# Patient Record
Sex: FEMALE | Race: ASIAN | NOT HISPANIC OR LATINO | Employment: OTHER | URBAN - METROPOLITAN AREA
[De-identification: names, ages, dates, MRNs, and addresses within clinical notes are randomized per-mention and may not be internally consistent; named-entity substitution may affect disease eponyms.]

---

## 2017-12-26 ENCOUNTER — HOSPITAL ENCOUNTER (EMERGENCY)
Facility: MEDICAL CENTER | Age: 62
End: 2017-12-26

## 2017-12-26 VITALS
TEMPERATURE: 98.4 F | RESPIRATION RATE: 18 BRPM | HEART RATE: 98 BPM | SYSTOLIC BLOOD PRESSURE: 119 MMHG | OXYGEN SATURATION: 99 % | HEIGHT: 62 IN | DIASTOLIC BLOOD PRESSURE: 70 MMHG

## 2017-12-26 PROCEDURE — 302449 STATCHG TRIAGE ONLY (STATISTIC)

## 2017-12-26 NOTE — ED NOTES
Pt to triage c/o  body chills, N/V/ diarrhea, and abd pain starting last night at 2300. Pt advised to return to triage nurse for any changes or concerns.

## 2019-12-10 ENCOUNTER — HOSPITAL ENCOUNTER (OUTPATIENT)
Facility: MEDICAL CENTER | Age: 64
End: 2019-12-10
Attending: OPHTHALMOLOGY | Admitting: OPHTHALMOLOGY
Payer: MEDICARE

## 2019-12-10 ENCOUNTER — ANESTHESIA EVENT (OUTPATIENT)
Dept: SURGERY | Facility: MEDICAL CENTER | Age: 64
End: 2019-12-10
Payer: MEDICARE

## 2019-12-10 ENCOUNTER — ANESTHESIA (OUTPATIENT)
Dept: SURGERY | Facility: MEDICAL CENTER | Age: 64
End: 2019-12-10
Payer: MEDICARE

## 2019-12-10 VITALS
WEIGHT: 93.47 LBS | RESPIRATION RATE: 16 BRPM | BODY MASS INDEX: 17.2 KG/M2 | HEART RATE: 71 BPM | SYSTOLIC BLOOD PRESSURE: 142 MMHG | HEIGHT: 62 IN | OXYGEN SATURATION: 100 % | TEMPERATURE: 97.8 F | DIASTOLIC BLOOD PRESSURE: 77 MMHG

## 2019-12-10 PROCEDURE — 160009 HCHG ANES TIME/MIN: Performed by: OPHTHALMOLOGY

## 2019-12-10 PROCEDURE — 160048 HCHG OR STATISTICAL LEVEL 1-5: Performed by: OPHTHALMOLOGY

## 2019-12-10 PROCEDURE — 700105 HCHG RX REV CODE 258: Performed by: OPHTHALMOLOGY

## 2019-12-10 PROCEDURE — 502240 HCHG MISC OR SUPPLY RC 0272: Performed by: OPHTHALMOLOGY

## 2019-12-10 PROCEDURE — 700111 HCHG RX REV CODE 636 W/ 250 OVERRIDE (IP)

## 2019-12-10 PROCEDURE — 501749 HCHG SHELL REV 276: Performed by: OPHTHALMOLOGY

## 2019-12-10 PROCEDURE — 500855 HCHG NEEDLE, IRRIG CYSTOTOME 27G: Performed by: OPHTHALMOLOGY

## 2019-12-10 PROCEDURE — 700101 HCHG RX REV CODE 250

## 2019-12-10 PROCEDURE — 700111 HCHG RX REV CODE 636 W/ 250 OVERRIDE (IP): Performed by: ANESTHESIOLOGY

## 2019-12-10 PROCEDURE — 501539 HCHG TIP, PHACO: Performed by: OPHTHALMOLOGY

## 2019-12-10 PROCEDURE — 160046 HCHG PACU - 1ST 60 MINS PHASE II: Performed by: OPHTHALMOLOGY

## 2019-12-10 PROCEDURE — 160025 RECOVERY II MINUTES (STATS): Performed by: OPHTHALMOLOGY

## 2019-12-10 PROCEDURE — 160029 HCHG SURGERY MINUTES - 1ST 30 MINS LEVEL 4: Performed by: OPHTHALMOLOGY

## 2019-12-10 DEVICE — IMPLANTABLE DEVICE: Type: IMPLANTABLE DEVICE | Site: EYE | Status: FUNCTIONAL

## 2019-12-10 RX ORDER — LISINOPRIL 10 MG/1
12.5 TABLET ORAL DAILY
COMMUNITY
End: 2021-11-15

## 2019-12-10 RX ORDER — PHENYLEPHRINE HYDROCHLORIDE 25 MG/ML
SOLUTION/ DROPS OPHTHALMIC
Status: COMPLETED
Start: 2019-12-10 | End: 2019-12-10

## 2019-12-10 RX ORDER — OFLOXACIN 3 MG/ML
1 SOLUTION/ DROPS OPHTHALMIC 4 TIMES DAILY
COMMUNITY
End: 2021-11-15

## 2019-12-10 RX ORDER — HYDROCODONE BITARTRATE AND ACETAMINOPHEN 7.5; 325 MG/1; MG/1
1-2 TABLET ORAL 3 TIMES DAILY
COMMUNITY
End: 2021-11-15 | Stop reason: SDUPTHER

## 2019-12-10 RX ORDER — MELOXICAM 7.5 MG/1
15 TABLET ORAL DAILY
COMMUNITY
End: 2021-11-15

## 2019-12-10 RX ORDER — EPINEPHRINE 1 MG/ML(1)
AMPUL (ML) INJECTION
Status: DISCONTINUED
Start: 2019-12-10 | End: 2019-12-10 | Stop reason: HOSPADM

## 2019-12-10 RX ORDER — BROMFENAC 0.76 MG/ML
SOLUTION/ DROPS OPHTHALMIC
COMMUNITY
End: 2021-11-15

## 2019-12-10 RX ORDER — ONDANSETRON 2 MG/ML
4 INJECTION INTRAMUSCULAR; INTRAVENOUS
Status: DISCONTINUED | OUTPATIENT
Start: 2019-12-10 | End: 2019-12-10 | Stop reason: HOSPADM

## 2019-12-10 RX ORDER — SODIUM CHLORIDE, SODIUM LACTATE, POTASSIUM CHLORIDE, CALCIUM CHLORIDE 600; 310; 30; 20 MG/100ML; MG/100ML; MG/100ML; MG/100ML
INJECTION, SOLUTION INTRAVENOUS CONTINUOUS
Status: DISCONTINUED | OUTPATIENT
Start: 2019-12-10 | End: 2019-12-10 | Stop reason: HOSPADM

## 2019-12-10 RX ORDER — MIDAZOLAM HYDROCHLORIDE 1 MG/ML
INJECTION INTRAMUSCULAR; INTRAVENOUS PRN
Status: DISCONTINUED | OUTPATIENT
Start: 2019-12-10 | End: 2019-12-10 | Stop reason: SURG

## 2019-12-10 RX ORDER — TETRACAINE HYDROCHLORIDE 5 MG/ML
SOLUTION OPHTHALMIC
Status: COMPLETED
Start: 2019-12-10 | End: 2019-12-10

## 2019-12-10 RX ORDER — TROPICAMIDE 10 MG/ML
SOLUTION/ DROPS OPHTHALMIC
Status: COMPLETED
Start: 2019-12-10 | End: 2019-12-10

## 2019-12-10 RX ORDER — MOXIFLOXACIN 5 MG/ML
SOLUTION/ DROPS OPHTHALMIC
Status: DISCONTINUED
Start: 2019-12-10 | End: 2019-12-10 | Stop reason: HOSPADM

## 2019-12-10 RX ORDER — GABAPENTIN 300 MG/1
300 CAPSULE ORAL 2 TIMES DAILY
COMMUNITY
End: 2022-02-04 | Stop reason: SDUPTHER

## 2019-12-10 RX ADMIN — FENTANYL CITRATE 50 MCG: 50 INJECTION, SOLUTION INTRAMUSCULAR; INTRAVENOUS at 12:52

## 2019-12-10 RX ADMIN — PHENYLEPHRINE HYDROCHLORIDE 1 DROP: 2.5 SOLUTION/ DROPS OPHTHALMIC at 12:00

## 2019-12-10 RX ADMIN — SODIUM CHLORIDE, POTASSIUM CHLORIDE, SODIUM LACTATE AND CALCIUM CHLORIDE: 600; 310; 30; 20 INJECTION, SOLUTION INTRAVENOUS at 12:00

## 2019-12-10 RX ADMIN — TROPICAMIDE 1 DROP: 10 SOLUTION/ DROPS OPHTHALMIC at 12:00

## 2019-12-10 RX ADMIN — TETRACAINE HYDROCHLORIDE 1 DROP: 5 SOLUTION OPHTHALMIC at 12:00

## 2019-12-10 RX ADMIN — MIDAZOLAM 2 MG: 1 INJECTION INTRAMUSCULAR; INTRAVENOUS at 12:51

## 2019-12-10 ASSESSMENT — PAIN SCALES - GENERAL: PAIN_LEVEL: 0

## 2019-12-10 NOTE — ANESTHESIA POSTPROCEDURE EVALUATION
Patient: Jemima Delong    Procedure Summary     Date:  12/10/19 Room / Location:  [facility] ROOM 27 / SURGERY SAME DAY [facility]    Anesthesia Start:  1247 Anesthesia Stop:  1310    Procedure:  PHACOEMULSIFICATION, CATARACT, WITH IOL INSERTION (Left Eye) Diagnosis:  (COMBINED FORM CATARACTS)    Surgeon:  Jose G Benavidez M.D. Responsible Provider:  Gurmeet Nieto M.D.    Anesthesia Type:  MAC ASA Status:  2          Final Anesthesia Type: MAC  Last vitals  BP   Blood Pressure : (!) 162/76    Temp   37.1 °C (98.8 °F)    Pulse   Pulse: 67   Resp   16    SpO2   100 %      Anesthesia Post Evaluation    Patient location during evaluation: PACU  Patient participation: complete - patient participated  Level of consciousness: awake and alert  Pain score: 0    Airway patency: patent  Anesthetic complications: no  Cardiovascular status: hemodynamically stable  Respiratory status: acceptable  Hydration status: euvolemic    PONV: none           Nurse Pain Score: 0 (NPRS)

## 2019-12-10 NOTE — ANESTHESIA TIME REPORT
Anesthesia Start and Stop Event Times     Date Time Event    12/10/2019 1243 Ready for Procedure     1247 Anesthesia Start     1310 Anesthesia Stop        Responsible Staff  12/10/19    Name Role Begin End    Gurmeet Nieto M.D. Anesth 1247 1310        Preop Diagnosis (Free Text):  Pre-op Diagnosis     COMBINED FORM CATARACTS        Preop Diagnosis (Codes):    Post op Diagnosis  Combined forms of age-related cataract of left eye      Premium Reason  Non-Premium    Comments:

## 2019-12-10 NOTE — OP REPORT
DATE OF SERVICE:  12/10/2019    PREOPERATIVE DIAGNOSIS:  Cataract, left eye.    POSTOPERATIVE DIAGNOSIS:  Cataract, left eye.    PROCEDURE:  Phacoemulsification with intraocular lens implant, left eye.    SURGEON:  Jose G Benavidez MD    ANESTHESIA:  Local MAC.    PROSTHETIC DEVICES:  [employer] intraocular lens, model PCB00,   14.0 diopter, serial #[id number].    INDICATIONS:  The patient has a visually significant cataract in the left eye.    DESCRIPTION OF OPERATION:  The patient was placed in the supine position on   the operating room table.  Appropriate anesthetic monitors were positioned.    The eye was prepped and draped in the usual sterile fashion for ocular surgery   using Betadine solution.  A wire lid speculum was positioned for exposure.  A   clear cornea temporal incision was made with a brenda keratome and a   paracentesis was made with a brenda knife.  The anterior chamber was filled   with viscoelastic and a continuous curvilinear capsulorrhexis was made with   the capsulorrhexis forceps.  The lens was hydrodissected and the nucleus was   removed using the phacoemulsification handpiece and the cortex was aspirated   with the IA handpiece.  The capsular bag was filled with viscoelastic and the   intraocular lens was positioned into the capsular bag.  Residual viscoelastic   was aspirated from the anterior chamber, and the wound was hydrated with   saline solution producing a watertight closure.  The wire lid speculum was   removed and the patient was taken to the recovery room in stable condition.       ____________________________________     MD KATHY BLANCO / NTS    DD:  12/10/2019 13:48:58  DT:  12/10/2019 14:11:16    D#:  [id number]  Job#:  [id number]

## 2019-12-10 NOTE — DISCHARGE INSTRUCTIONS
HOME CARE INSTRUCTIONS FOR CATARACT SURGERY    ACTIVITY: Rest and take it easy for the first 24 hours. We strongly suggest that a responsible adult remain with you during that time. It is normal to feel sleepy. We encourage you to not do anything that requires balance, judgment or coordination. Be extra careful when walking (with a dilated eye, it is easier to trip and fall).     FOR 24 HOURS, DO NOT:       Drive, operate machinery or run household appliances.        Drink beer or alcoholic beverages.        Make important decisions or sign legal documents.     DIET: To avoid nausea, slowly advance diet as tolerated, avoiding spicy or greasy foods for the first meal.     MEDICATIONS: Resume taking daily medication. You may take Tylenol for mild discomfort, if needed.     SURGICAL DRESSING: Eye shield as instructed by your doctor. Dark glasses should be worn while in the sunlight.     Follow your Physician's instruction Sheet. Eye Kit Given    A follow-up appointment is scheduled with your doctor tomorrow at 2:15 pm.     You should call 911 if you develop problems with breathing or chest pain.  You should CALL YOUR PHYSICIAN if you develop: Sharp stabbing pain or sudden change in vision in your operative eye. If you are unable to contact your doctor or the surgical center, you should go to the nearest emergency room or urgent care center.   Physician's telephone # [phone]    If any questions arise, call your doctor. If your doctor is not available, please feel free to call Same Day Surgery at [phone]. You can also call the [facility] Hotline open 24 hours/day, 7 days/week and speak to a nurse at [phone] or [phone].     I acknowledge receipt and understanding of these Home Care Instructions.    ______________________________     _______________________________            Signature of Patient / Responsible Adult                                                       RN Signature    A registered nurse may  call you a few days after your surgery to see how you are doing.   You may also receive a survey in the mail within the next two weeks and we ask that you take a few moments to complete and return the survey. Our goal is to provide you with very good care and we value your comments. Thank you for choosing [facility].

## 2020-01-14 ENCOUNTER — HOSPITAL ENCOUNTER (OUTPATIENT)
Facility: MEDICAL CENTER | Age: 65
End: 2020-01-14
Attending: OPHTHALMOLOGY | Admitting: OPHTHALMOLOGY
Payer: MEDICARE

## 2020-01-14 ENCOUNTER — ANESTHESIA EVENT (OUTPATIENT)
Dept: SURGERY | Facility: MEDICAL CENTER | Age: 65
End: 2020-01-14
Payer: MEDICARE

## 2020-01-14 ENCOUNTER — ANESTHESIA (OUTPATIENT)
Dept: SURGERY | Facility: MEDICAL CENTER | Age: 65
End: 2020-01-14
Payer: MEDICARE

## 2020-01-14 VITALS
HEART RATE: 75 BPM | RESPIRATION RATE: 16 BRPM | DIASTOLIC BLOOD PRESSURE: 60 MMHG | BODY MASS INDEX: 17.77 KG/M2 | HEIGHT: 62 IN | OXYGEN SATURATION: 97 % | TEMPERATURE: 97.8 F | WEIGHT: 96.56 LBS | SYSTOLIC BLOOD PRESSURE: 130 MMHG

## 2020-01-14 PROCEDURE — 700101 HCHG RX REV CODE 250

## 2020-01-14 PROCEDURE — 700111 HCHG RX REV CODE 636 W/ 250 OVERRIDE (IP): Performed by: ANESTHESIOLOGY

## 2020-01-14 PROCEDURE — 700105 HCHG RX REV CODE 258: Performed by: OPHTHALMOLOGY

## 2020-01-14 PROCEDURE — 502240 HCHG MISC OR SUPPLY RC 0272: Performed by: OPHTHALMOLOGY

## 2020-01-14 PROCEDURE — 160046 HCHG PACU - 1ST 60 MINS PHASE II: Performed by: OPHTHALMOLOGY

## 2020-01-14 PROCEDURE — 160025 RECOVERY II MINUTES (STATS): Performed by: OPHTHALMOLOGY

## 2020-01-14 PROCEDURE — 160028 HCHG SURGERY MINUTES - 1ST 30 MINS LEVEL 3: Performed by: OPHTHALMOLOGY

## 2020-01-14 PROCEDURE — 501749 HCHG SHELL REV 276: Performed by: OPHTHALMOLOGY

## 2020-01-14 PROCEDURE — 501539 HCHG TIP, PHACO: Performed by: OPHTHALMOLOGY

## 2020-01-14 PROCEDURE — 700111 HCHG RX REV CODE 636 W/ 250 OVERRIDE (IP): Mod: JW

## 2020-01-14 PROCEDURE — 160048 HCHG OR STATISTICAL LEVEL 1-5: Performed by: OPHTHALMOLOGY

## 2020-01-14 PROCEDURE — 500855 HCHG NEEDLE, IRRIG CYSTOTOME 27G: Performed by: OPHTHALMOLOGY

## 2020-01-14 PROCEDURE — 160009 HCHG ANES TIME/MIN: Performed by: OPHTHALMOLOGY

## 2020-01-14 DEVICE — IMPLANTABLE DEVICE: Type: IMPLANTABLE DEVICE | Site: EYE | Status: FUNCTIONAL

## 2020-01-14 RX ORDER — TROPICAMIDE 10 MG/ML
SOLUTION/ DROPS OPHTHALMIC
Status: COMPLETED
Start: 2020-01-14 | End: 2020-01-14

## 2020-01-14 RX ORDER — SODIUM CHLORIDE, SODIUM LACTATE, POTASSIUM CHLORIDE, CALCIUM CHLORIDE 600; 310; 30; 20 MG/100ML; MG/100ML; MG/100ML; MG/100ML
INJECTION, SOLUTION INTRAVENOUS CONTINUOUS
Status: DISCONTINUED | OUTPATIENT
Start: 2020-01-14 | End: 2020-01-14 | Stop reason: HOSPADM

## 2020-01-14 RX ORDER — ONDANSETRON 2 MG/ML
4 INJECTION INTRAMUSCULAR; INTRAVENOUS
Status: DISCONTINUED | OUTPATIENT
Start: 2020-01-14 | End: 2020-01-14 | Stop reason: HOSPADM

## 2020-01-14 RX ORDER — TETRACAINE HYDROCHLORIDE 5 MG/ML
SOLUTION OPHTHALMIC
Status: COMPLETED
Start: 2020-01-14 | End: 2020-01-14

## 2020-01-14 RX ORDER — LABETALOL HYDROCHLORIDE 5 MG/ML
5 INJECTION, SOLUTION INTRAVENOUS
Status: DISCONTINUED | OUTPATIENT
Start: 2020-01-14 | End: 2020-01-14 | Stop reason: HOSPADM

## 2020-01-14 RX ORDER — PHENYLEPHRINE HYDROCHLORIDE 25 MG/ML
SOLUTION/ DROPS OPHTHALMIC
Status: COMPLETED
Start: 2020-01-14 | End: 2020-01-14

## 2020-01-14 RX ADMIN — MIDAZOLAM HYDROCHLORIDE 1 MG: 1 INJECTION, SOLUTION INTRAMUSCULAR; INTRAVENOUS at 11:28

## 2020-01-14 RX ADMIN — PHENYLEPHRINE HYDROCHLORIDE: 2.5 SOLUTION/ DROPS OPHTHALMIC at 11:00

## 2020-01-14 RX ADMIN — TROPICAMIDE 1 DROP: 10 SOLUTION/ DROPS OPHTHALMIC at 11:00

## 2020-01-14 RX ADMIN — SODIUM CHLORIDE, POTASSIUM CHLORIDE, SODIUM LACTATE AND CALCIUM CHLORIDE: 600; 310; 30; 20 INJECTION, SOLUTION INTRAVENOUS at 11:11

## 2020-01-14 RX ADMIN — TETRACAINE HYDROCHLORIDE: 5 SOLUTION OPHTHALMIC at 11:00

## 2020-01-14 RX ADMIN — FENTANYL CITRATE 50 MCG: 50 INJECTION, SOLUTION INTRAMUSCULAR; INTRAVENOUS at 11:28

## 2020-01-14 ASSESSMENT — PAIN SCALES - GENERAL: PAIN_LEVEL: 0

## 2020-01-14 NOTE — ANESTHESIA QCDR
2019 [facility] Clinical Data Registry (for Quality Improvement)     Postoperative nausea/vomiting risk protocol (Adult = 18 yrs and Pediatric 3-17 yrs)- (430 and 463)  General inhalation anesthetic (NOT TIVA) with PONV risk factors: No  Provision of anti-emetic therapy with at least 2 different classes of agents: N/A  Patient DID NOT receive anti-emetic therapy and reason is documented in Medical Record: N/A    Multimodal Pain Management- (477)  Non-emergent surgery AND patient age >= 18: Yes  Use of Multimodal Pain Management, two or more drugs and/or interventions, NOT including systemic opioids: No  Exception: Documented allergy to multiple classes of analgesics:        Smoking Abstinence (404)  Patient is current smoker (cigarette, pipe, e-cig, marijuanna): No  Elective Surgery:   Abstinence instructions provided prior to day of surgery:   Patient abstained from smoking on day of surgery:     Pre-Op Beta-Blocker in Isolated CABG (44)  Isolated CABG AND patient age >= 18: No  Beta-blocker admin within 24 hours of surgical incision:   Exception:of medical reason(s) for not administering beta blocker within 24 hours prior to surgical incision (e.g., not  indicated,other medical reason):     PACU assessment of acute postoperative pain prior to Anesthesia Care End- Applies to Patients Age = 18- (ABG7)  Initial PACU pain score is which of the following: < 7/10  Patient unable to report pain score: N/A    Post-anesthetic transfer of care checklist/protocol to PACU/ICU- (426 and 427)  Upon conclusion of case, patient transferred to which of the following locations: PACU/Non-ICU  Use of transfer checklist/protocol: Yes  Exclusion: Service Performed in Patient Hospital Room (and thus did not require transfer): N/A  Unplanned admission to ICU related to anesthesia service up through end of PACU care- (MD51)  Unplanned admission to ICU (not initially anticipated at anesthesia start time): No

## 2020-01-14 NOTE — ANESTHESIA PREPROCEDURE EVALUATION
Relevant Problems   No relevant active problems       Physical Exam    Airway   Mallampati: II  TM distance: >3 FB  Neck ROM: full       Cardiovascular - normal exam  Rhythm: regular  Rate: normal  (-) murmur     Dental - normal exam         Pulmonary - normal exam  Breath sounds clear to auscultation     Abdominal    Neurological - normal exam                 Anesthesia Plan    ASA 1       Plan - MAC             Induction: intravenous      Pertinent diagnostic labs and testing reviewed    Informed Consent:    Anesthetic plan and risks discussed with patient.    Use of blood products discussed with: patient whom consented to blood products.

## 2020-01-14 NOTE — ANESTHESIA TIME REPORT
Anesthesia Start and Stop Event Times     Date Time Event    1/14/2020 1042 Ready for Procedure     1124 Anesthesia Start        Responsible Staff  01/14/20    Name Role Begin End    Bruno Villarreal D.O. Anesth 1124         Preop Diagnosis (Free Text):  Pre-op Diagnosis     COMBINED FORM CATARACTS        Preop Diagnosis (Codes):    Post op Diagnosis  Cataract      Premium Reason  Non-Premium    Comments:

## 2020-01-14 NOTE — DISCHARGE INSTRUCTIONS
HOME CARE INSTRUCTIONS FOR CATARACT SURGERY    ACTIVITY: Rest and take it easy for the first 24 hours. We strongly suggest that a responsible adult remain with you during that time. It is normal to feel sleepy. We encourage you to not do anything that requires balance, judgment or coordination. Be extra careful when walking (with a dilated eye, it is easier to trip and fall).     FOR 24 HOURS, DO NOT:       Drive, operate machinery or run household appliances.        Drink beer or alcoholic beverages.        Make important decisions or sign legal documents.     DIET: To avoid nausea, slowly advance diet as tolerated, avoiding spicy or greasy foods for the first meal.     MEDICATIONS: Resume taking daily medication. You may take Tylenol for mild discomfort, if needed.     SURGICAL DRESSING: Eye shield as instructed by your doctor. Dark glasses should be worn while in the sunlight.     Follow your Physician's instruction Sheet. Eye Kit Given    A follow-up appointment is scheduled with your doctor tomorrow at __2:15pm.     You should call 911 if you develop problems with breathing or chest pain.  You should CALL YOUR PHYSICIAN if you develop: Sharp stabbing pain or sudden change in vision in your operative eye. If you are unable to contact your doctor or the surgical center, you should go to the nearest emergency room or urgent care center. Physician's telephone # __________________________    If any questions arise, call your doctor. If your doctor is not available, please feel free to call Same Day Surgery at [phone]. You can also call the [facility] Hotline open 24 hours/day, 7 days/week and speak to a nurse at [phone] or [phone].     I acknowledge receipt and understanding of these Home Care Instructions.    ______________________________     _______________________________            Signature of Patient / Responsible Adult                                                       RN Signature    A  registered nurse may call you a few days after your surgery to see how you are doing.   You may also receive a survey in the mail within the next two weeks and we ask that you take a few moments to complete and return the survey. Our goal is to provide you with very good care and we value your comments. Thank you for choosing [facility].

## 2020-01-14 NOTE — OR NURSING
1144-Received from OR via [facility]. S/P Right eye cataract. Eye patch in place.  Bedside report received from RN. And Anesthesiologist.    1200-sitting up drinking water,  at bedside.    1210-meets discharge criteria. Iv removed. Instructions explained to  and patient.  All questions answered. Discharged home with responsible party. Escorted to car via wheelchair.

## 2020-01-14 NOTE — ANESTHESIA POSTPROCEDURE EVALUATION
Patient: Jemima Delong    Procedure Summary     Date:  01/14/20 Room / Location:  [facility] ROOM 27 / SURGERY SAME DAY [facility]    Anesthesia Start:  1124 Anesthesia Stop:  1144    Procedure:  PHACOEMULSIFICATION, CATARACT, WITH IOL INSERTION (Right Eye) Diagnosis:  (COMBINED FORM CATARACTS)    Surgeon:  JoseG Benavidez M.D. Responsible Provider:  Bruno Villarreal D.O.    Anesthesia Type:  MAC ASA Status:  1          Final Anesthesia Type: MAC  Last vitals  BP   Blood Pressure : (!) 164/72    Temp   37.1 °C (98.7 °F)    Pulse   Pulse: 75   Resp   16    SpO2   97 %      Anesthesia Post Evaluation    Patient location during evaluation: PACU  Patient participation: complete - patient participated  Level of consciousness: awake and alert  Pain score: 0    Airway patency: patent  Anesthetic complications: no  Cardiovascular status: hemodynamically stable  Respiratory status: acceptable  Hydration status: euvolemic    PONV: none           Nurse Pain Score: 0 (NPRS)

## 2020-01-14 NOTE — OP REPORT
DATE OF SERVICE:  01/14/2020    PREOPERATIVE DIAGNOSIS:  Cataract, right eye.    POSTOPERATIVE DIAGNOSIS:  Cataract, right eye.    PROCEDURE:  Phacoemulsification with intraocular lens implant, right eye.    SURGEON:  Jose G Benavidez MD    ANESTHESIA:  Local MAC.    PROSTHETIC DEVICES:  Kareem [employer] Vision intraocular lens, model PCB00,   14.0 diopter, serial #[id number].    INDICATIONS:  The patient has a visually significant cataract in the right   eye.    DESCRIPTION OF OPERATION:  The patient was placed in the supine position on   the operating room table.  Appropriate anesthetic monitors were positioned.    The eye was prepped and draped in the usual sterile fashion for ocular surgery   using Betadine solution.  A wire lid speculum was positioned for exposure.  A   clear cornea temporal incision was made with a brenda keratome and a   paracentesis was made with a brenda knife.  The anterior chamber was filled   with viscoelastic and a continuous curvilinear capsulorrhexis was made with   the capsulorrhexis forceps.  The lens was hydrodissected and the nucleus was   removed using the phacoemulsification handpiece and the cortex was aspirated   with the IA handpiece.  The capsular bag was filled with viscoelastic and the   intraocular lens was positioned into the capsular bag.  Residual viscoelastic   was aspirated from the anterior chamber, and the wound was hydrated with   saline solution producing a watertight closure.  The wire lid speculum was   removed and the patient was taken to the recovery room in stable condition.       ____________________________________     MD KATHY BLANCO / NTS    DD:  01/14/2020 13:06:41  DT:  01/14/2020 14:03:01    D#:  [id number]  Job#:  [id number]

## 2021-03-03 DIAGNOSIS — Z23 NEED FOR VACCINATION: ICD-10-CM

## 2021-11-15 ENCOUNTER — OFFICE VISIT (OUTPATIENT)
Dept: MEDICAL GROUP | Facility: CLINIC | Age: 66
End: 2021-11-15
Payer: MEDICARE

## 2021-11-15 VITALS
BODY MASS INDEX: 19.32 KG/M2 | DIASTOLIC BLOOD PRESSURE: 70 MMHG | SYSTOLIC BLOOD PRESSURE: 110 MMHG | WEIGHT: 105 LBS | OXYGEN SATURATION: 95 % | HEART RATE: 75 BPM | HEIGHT: 62 IN | RESPIRATION RATE: 16 BRPM

## 2021-11-15 DIAGNOSIS — M54.50 CHRONIC BILATERAL LOW BACK PAIN WITHOUT SCIATICA: ICD-10-CM

## 2021-11-15 DIAGNOSIS — G89.29 CHRONIC BILATERAL LOW BACK PAIN WITHOUT SCIATICA: ICD-10-CM

## 2021-11-15 DIAGNOSIS — M67.449 DIGITAL MUCINOUS CYST: ICD-10-CM

## 2021-11-15 DIAGNOSIS — M67.449 DIGITAL MUCINOUS CYST OF FINGER: ICD-10-CM

## 2021-11-15 PROBLEM — I10 HTN (HYPERTENSION): Status: ACTIVE | Noted: 2021-11-15

## 2021-11-15 PROBLEM — M75.101 ROTATOR CUFF SYNDROME, RIGHT: Status: ACTIVE | Noted: 2019-03-04

## 2021-11-15 PROBLEM — L30.9 ECZEMA: Status: ACTIVE | Noted: 2021-08-26

## 2021-11-15 PROBLEM — M25.571 ARTHRALGIA OF RIGHT ANKLE: Status: ACTIVE | Noted: 2019-12-24

## 2021-11-15 PROBLEM — M19.90 ARTHRITIS: Status: ACTIVE | Noted: 2018-04-04

## 2021-11-15 PROBLEM — G89.4 CHRONIC PAIN SYNDROME: Status: ACTIVE | Noted: 2021-11-15

## 2021-11-15 PROCEDURE — 99214 OFFICE O/P EST MOD 30 MIN: CPT | Performed by: FAMILY MEDICINE

## 2021-11-15 RX ORDER — MELOXICAM 15 MG/1
TABLET ORAL
COMMUNITY
Start: 2021-08-30 | End: 2022-05-25 | Stop reason: SDUPTHER

## 2021-11-15 RX ORDER — TRIAMCINOLONE ACETONIDE 1 MG/G
CREAM TOPICAL
COMMUNITY
Start: 2021-08-26 | End: 2023-08-29

## 2021-11-15 RX ORDER — OXYBUTYNIN CHLORIDE 5 MG/1
TABLET ORAL
COMMUNITY
Start: 2021-08-25 | End: 2022-06-06 | Stop reason: SDUPTHER

## 2021-11-15 RX ORDER — LISINOPRIL AND HYDROCHLOROTHIAZIDE 12.5; 1 MG/1; MG/1
TABLET ORAL
COMMUNITY
Start: 2021-09-02 | End: 2022-04-14 | Stop reason: SDUPTHER

## 2021-11-15 RX ORDER — OMEPRAZOLE 20 MG/1
CAPSULE, DELAYED RELEASE ORAL
COMMUNITY
Start: 2021-11-06 | End: 2022-02-04

## 2021-11-15 RX ORDER — CYCLOBENZAPRINE HCL 10 MG
10 TABLET ORAL 3 TIMES DAILY PRN
Qty: 30 TABLET | Refills: 0 | Status: SHIPPED | OUTPATIENT
Start: 2021-11-15 | End: 2022-04-28 | Stop reason: SDUPTHER

## 2021-11-15 RX ORDER — FLUTICASONE PROPIONATE 50 MCG
SPRAY, SUSPENSION (ML) NASAL
COMMUNITY
Start: 2021-09-07 | End: 2022-03-02 | Stop reason: SDUPTHER

## 2021-11-15 RX ORDER — HYDROCODONE BITARTRATE AND ACETAMINOPHEN 7.5; 325 MG/1; MG/1
1-2 TABLET ORAL 3 TIMES DAILY
Qty: 90 TABLET | Refills: 0 | Status: SHIPPED | OUTPATIENT
Start: 2021-12-15 | End: 2022-01-12 | Stop reason: SDUPTHER

## 2021-11-15 RX ORDER — HYDROCODONE BITARTRATE AND ACETAMINOPHEN 7.5; 325 MG/1; MG/1
1-2 TABLET ORAL 3 TIMES DAILY
Qty: 90 TABLET | Refills: 0 | Status: SHIPPED | OUTPATIENT
Start: 2022-01-15 | End: 2022-02-04 | Stop reason: SDUPTHER

## 2021-11-15 RX ORDER — HYDROCODONE BITARTRATE AND ACETAMINOPHEN 7.5; 325 MG/1; MG/1
1-2 TABLET ORAL 3 TIMES DAILY
Qty: 90 TABLET | Refills: 0 | Status: SHIPPED | OUTPATIENT
Start: 2021-11-15 | End: 2021-12-15

## 2021-11-15 NOTE — PROGRESS NOTES
Subjective:     CC: Here for controlled substances refills and pain in finger    HPI:   Jemima presents today with here for controlled substances refills and pain in finger.  For the past 4 weeks she has noticed increased pain in her left first digit DIP joint.  There is a cyst located over a nodule on her DIP joint as well there is some redness around her digit as well she is currently taking meloxicam without effect.    Increasing back pain over the past 4 weeks.  She is currently taking hydrocodone.  She is also taking meloxicam and using Salonpas on her back.  No radiculopathy.    Problem   Chronic Pain Syndrome   Htn (Hypertension)   Digital Mucinous Cyst    Cyst on finger for one week.     Eczema   Arthralgia of Right Ankle   Rotator Cuff Syndrome, Right   Arthritis   Sciatic Nerve Lesion, Left   Low Back Pain    Increased pain low back.  4 days increased.  No injury.Using meloxicam and hydrocodone with topical solonpas.      Osteopenia   Plantar Fasciitis   Cataract of Both Eyes       Current Outpatient Medications Ordered in Epic   Medication Sig Dispense Refill   • fluticasone (FLONASE) 50 MCG/ACT nasal spray      • meloxicam (MOBIC) 15 MG tablet      • lisinopril-hydrochlorothiazide (PRINZIDE) 10-12.5 MG per tablet      • omeprazole (PRILOSEC) 20 MG delayed-release capsule      • oxybutynin (DITROPAN) 5 MG Tab      • triamcinolone acetonide (KENALOG) 0.1 % Cream      • diclofenac sodium (VOLTAREN) 1 % Gel Apply 2 g topically 4 times a day as needed. 100 g 3   • cyclobenzaprine (FLEXERIL) 10 mg Tab Take 1 Tablet by mouth 3 times a day as needed. 30 Tablet 0   • HYDROcodone-acetaminophen (NORCO) 7.5-325 MG per tablet Take 1-2 Tablets by mouth 3 times a day for 30 days. Indications: Pain, Chronic back pain 90 Tablet 0   • [START ON 12/15/2021] HYDROcodone-acetaminophen (NORCO) 7.5-325 MG per tablet Take 1-2 Tablets by mouth 3 times a day for 31 days. Indications: Pain, Chronic back pain 90 Tablet 0   • [START  "ON 1/15/2022] HYDROcodone-acetaminophen (NORCO) 7.5-325 MG per tablet Take 1-2 Tablets by mouth 3 times a day for 31 days. Indications: Pain, Chronic back pain 90 Tablet 0   • gabapentin (NEURONTIN) 300 MG Cap Take 300 mg by mouth 2 Times a Day. Indications: foot pain       No current Epic-ordered facility-administered medications on file.       Health Maintenance:     ROS:  Gen: no fevers/chills, no changes in weight  Eyes: no changes in vision  ENT: no sore throat, no hearing loss, no bloody nose  Pulm: no sob, no cough  CV: no chest pain, no palpitations  GI: no nausea/vomiting, no diarrhea  : no dysuria  MSk: no myalgias  Skin: no rash  Neuro: no headaches, no numbness/tingling  Heme/Lymph: no easy bruising      Objective:     Exam:  /70 (BP Location: Left arm, Patient Position: Sitting, BP Cuff Size: Adult)   Pulse 75   Resp 16   Ht 1.575 m (5' 2\")   Wt 47.6 kg (105 lb)   SpO2 95%   BMI 19.20 kg/m²  Body mass index is 19.2 kg/m².    Gen: Alert and oriented, No apparent distress.  Neck: Neck is supple without lymphadenopathy.  Lungs: Normal effort, CTA bilaterally, no wheezes, rhonchi, or rales  CV: Regular rate and rhythm. No murmurs, rubs, or gallops.  Ext: No clubbing, cyanosis, edema.          Assessment & Plan:     66 y.o. female with the following -     Problem List Items Addressed This Visit     Low back pain     Will refilll meds and send to physical therapy  Will add cyclobenzaprine at night         Relevant Medications    meloxicam (MOBIC) 15 MG tablet    cyclobenzaprine (FLEXERIL) 10 mg Tab    HYDROcodone-acetaminophen (NORCO) 7.5-325 MG per tablet    HYDROcodone-acetaminophen (NORCO) 7.5-325 MG per tablet (Start on 12/15/2021)    HYDROcodone-acetaminophen (NORCO) 7.5-325 MG per tablet (Start on 1/15/2022)    Other Relevant Orders    Referral to Physical Therapy    Controlled Substance Treatment Agreement    Digital mucinous cyst     Has osteophyte, heberden's node.  Overlying " cyst.  Continue meloxicam and will add voltaren gel           Other Visit Diagnoses     Digital mucinous cyst of finger                  Return in about 3 months (around 2/15/2022) for CS refill.    Please note that this dictation was created using voice recognition software. I have made every reasonable attempt to correct obvious errors, but I expect that there are errors of grammar and possibly content that I did not discover before finalizing the note.

## 2021-12-13 DIAGNOSIS — G89.29 CHRONIC BILATERAL LOW BACK PAIN WITHOUT SCIATICA: ICD-10-CM

## 2021-12-13 DIAGNOSIS — M54.50 CHRONIC BILATERAL LOW BACK PAIN WITHOUT SCIATICA: ICD-10-CM

## 2021-12-13 RX ORDER — HYDROCODONE BITARTRATE AND ACETAMINOPHEN 7.5; 325 MG/1; MG/1
1 TABLET ORAL EVERY 6 HOURS PRN
Qty: 30 TABLET | Refills: 0 | Status: SHIPPED | OUTPATIENT
Start: 2021-12-13 | End: 2022-01-13 | Stop reason: SDUPTHER

## 2022-01-11 ENCOUNTER — TELEPHONE (OUTPATIENT)
Dept: MEDICAL GROUP | Facility: CLINIC | Age: 67
End: 2022-01-11

## 2022-01-11 NOTE — TELEPHONE ENCOUNTER
Caller Name: Jemima Delong  Call Back Number: [phone] (home) [phone] (work)    Patients  called regarding her Hydrocodone.. she has ran out of her medication and has not been able to pick it up.. he states that they were getting a 120 quantity Rx.. they are still waiting on this approval.. they are not sure if the insurance will give them issues with the quantity ..    If they could get the 90 day quantity that would be appreciated

## 2022-01-12 DIAGNOSIS — M54.50 CHRONIC BILATERAL LOW BACK PAIN WITHOUT SCIATICA: ICD-10-CM

## 2022-01-12 DIAGNOSIS — G89.29 CHRONIC BILATERAL LOW BACK PAIN WITHOUT SCIATICA: ICD-10-CM

## 2022-01-12 RX ORDER — HYDROCODONE BITARTRATE AND ACETAMINOPHEN 7.5; 325 MG/1; MG/1
1-2 TABLET ORAL 3 TIMES DAILY
Qty: 90 TABLET | Refills: 0 | Status: CANCELLED | OUTPATIENT
Start: 2022-01-15 | End: 2022-02-15

## 2022-01-12 RX ORDER — HYDROCODONE BITARTRATE AND ACETAMINOPHEN 7.5; 325 MG/1; MG/1
1-2 TABLET ORAL EVERY 4 HOURS PRN
Qty: 120 TABLET | Refills: 0 | Status: SHIPPED | OUTPATIENT
Start: 2022-01-12 | End: 2022-02-04 | Stop reason: RX

## 2022-01-13 DIAGNOSIS — M54.50 CHRONIC BILATERAL LOW BACK PAIN WITHOUT SCIATICA: ICD-10-CM

## 2022-01-13 DIAGNOSIS — G89.29 CHRONIC BILATERAL LOW BACK PAIN WITHOUT SCIATICA: ICD-10-CM

## 2022-01-13 RX ORDER — HYDROCODONE BITARTRATE AND ACETAMINOPHEN 7.5; 325 MG/1; MG/1
1 TABLET ORAL EVERY 6 HOURS PRN
Qty: 30 TABLET | Refills: 0 | Status: SHIPPED | OUTPATIENT
Start: 2022-01-13 | End: 2022-02-04 | Stop reason: RX

## 2022-01-13 NOTE — TELEPHONE ENCOUNTER
I spoke with the pharmacy regarding the issue.   She states that the insurance will not cover the medication with a sig of 31 days, they will only cover with a sig of 30 days. Could you please update the sig to 30 days?    The patient is also requesting a 90 day supply for future fills.     Thank you

## 2022-01-13 NOTE — TELEPHONE ENCOUNTER
Received request via: Patient    Was the patient seen in the last year in this department? Yes    Does the patient have an active prescription (recently filled or refills available) for medication(s) requested? No    Patient is requesting a 90 day supply

## 2022-02-04 ENCOUNTER — OFFICE VISIT (OUTPATIENT)
Dept: MEDICAL GROUP | Facility: CLINIC | Age: 67
End: 2022-02-04
Payer: MEDICARE

## 2022-02-04 VITALS
RESPIRATION RATE: 16 BRPM | HEART RATE: 79 BPM | BODY MASS INDEX: 19.32 KG/M2 | SYSTOLIC BLOOD PRESSURE: 120 MMHG | OXYGEN SATURATION: 94 % | WEIGHT: 105 LBS | HEIGHT: 62 IN | DIASTOLIC BLOOD PRESSURE: 68 MMHG

## 2022-02-04 DIAGNOSIS — M54.50 CHRONIC BILATERAL LOW BACK PAIN WITHOUT SCIATICA: ICD-10-CM

## 2022-02-04 DIAGNOSIS — S86.892A LEFT MEDIAL TIBIAL STRESS SYNDROME, INITIAL ENCOUNTER: ICD-10-CM

## 2022-02-04 DIAGNOSIS — G89.29 CHRONIC BILATERAL LOW BACK PAIN WITHOUT SCIATICA: ICD-10-CM

## 2022-02-04 DIAGNOSIS — M19.90 ARTHRITIS: ICD-10-CM

## 2022-02-04 PROCEDURE — 99214 OFFICE O/P EST MOD 30 MIN: CPT | Performed by: FAMILY MEDICINE

## 2022-02-04 RX ORDER — HYDROCODONE BITARTRATE AND ACETAMINOPHEN 7.5; 325 MG/1; MG/1
1-2 TABLET ORAL EVERY 4 HOURS PRN
Qty: 120 TABLET | Refills: 0 | Status: SHIPPED | OUTPATIENT
Start: 2022-02-04 | End: 2022-03-04

## 2022-02-04 RX ORDER — HYDROCODONE BITARTRATE AND ACETAMINOPHEN 7.5; 325 MG/1; MG/1
1-2 TABLET ORAL EVERY 4 HOURS PRN
Qty: 120 TABLET | Refills: 0 | Status: SHIPPED | OUTPATIENT
Start: 2022-04-01 | End: 2022-04-05 | Stop reason: SDUPTHER

## 2022-02-04 RX ORDER — HYDROCODONE BITARTRATE AND ACETAMINOPHEN 7.5; 325 MG/1; MG/1
1-2 TABLET ORAL EVERY 4 HOURS PRN
Qty: 120 TABLET | Refills: 0 | Status: SHIPPED | OUTPATIENT
Start: 2022-03-04 | End: 2022-04-01

## 2022-02-04 RX ORDER — HYDROCODONE BITARTRATE AND ACETAMINOPHEN 7.5; 325 MG/1; MG/1
1 TABLET ORAL EVERY 4 HOURS PRN
COMMUNITY
End: 2022-02-04 | Stop reason: RX

## 2022-02-04 RX ORDER — GABAPENTIN 300 MG/1
300 CAPSULE ORAL 2 TIMES DAILY
Qty: 90 CAPSULE | Refills: 3 | Status: SHIPPED | OUTPATIENT
Start: 2022-02-04 | End: 2022-06-06 | Stop reason: SDUPTHER

## 2022-02-04 NOTE — PROGRESS NOTES
Subjective:     CC: Medication issues    HPI:   Jemima presents today with complaints of the pharmacy getting her medications and correct.  Do not know exactly what is happening, the pharmacy keeps on refilling her medications incorrectly.  In addition when they do fill minutes and an accurate amount is not what I been prescribing.  Patient is very frustrated as is her physician.    She has a new complaint of left shin splint.  She knows of no activity that brought that on.  She is currently doing some massage and taking NSAIDs.  New    She is developing quite a bit of Heberden's nodules on her DIP joints.  She has a new cyst located on her right first DIP joint.  It is pretty tender.    Her back pain is about the same.  No known injury slightly worse the last 4 days.  She is using a combination of meloxicam Salonpas and hydrocodone.  She has finished physical therapy.    Problem   Left Medial Tibial Stress Syndrome    L shin splint     Arthritis    Has osteophytes on fingers and noting cyst R 1st DIP joint.     Low Back Pain    Increased pain low back.  4 days increased.  No injury.Using meloxicam and hydrocodone with topical solonpas. Has had issues with getting pain meds refilled         Current Outpatient Medications Ordered in Epic   Medication Sig Dispense Refill   • HYDROcodone-acetaminophen (NORCO) 7.5-325 MG per tablet Take 1-2 Tablets by mouth every four hours as needed for up to 28 days. Indications: Pain, Chronic back pain 120 Tablet 0   • [START ON 3/4/2022] HYDROcodone-acetaminophen (NORCO) 7.5-325 MG per tablet Take 1-2 Tablets by mouth every four hours as needed for up to 28 days. Indications: Pain, Chronic back pain 120 Tablet 0   • [START ON 4/1/2022] HYDROcodone-acetaminophen (NORCO) 7.5-325 MG per tablet Take 1-2 Tablets by mouth every four hours as needed for up to 28 days. Indications: Pain, Chronic back pain 120 Tablet 0   • gabapentin (NEURONTIN) 300 MG Cap Take 1 Capsule by mouth 2 times a  "day. Indications: foot pain 90 Capsule 3   • fluticasone (FLONASE) 50 MCG/ACT nasal spray      • meloxicam (MOBIC) 15 MG tablet      • lisinopril-hydrochlorothiazide (PRINZIDE) 10-12.5 MG per tablet      • oxybutynin (DITROPAN) 5 MG Tab      • triamcinolone acetonide (KENALOG) 0.1 % Cream      • diclofenac sodium (VOLTAREN) 1 % Gel Apply 2 g topically 4 times a day as needed. 100 g 3   • cyclobenzaprine (FLEXERIL) 10 mg Tab Take 1 Tablet by mouth 3 times a day as needed. 30 Tablet 0     No current Epic-ordered facility-administered medications on file.       Health Maintenance:     ROS:  Gen: no fevers/chills, no changes in weight  Eyes: no changes in vision  ENT: no sore throat, no hearing loss, no bloody nose  Pulm: no sob, no cough  CV: no chest pain, no palpitations  GI: no nausea/vomiting, no diarrhea  : no dysuria  MSk: no myalgias  Skin: no rash  Neuro: no headaches, no numbness/tingling  Heme/Lymph: no easy bruising      Objective:     Exam:  /68 (BP Location: Right arm, Patient Position: Sitting, BP Cuff Size: Adult)   Pulse 79   Resp 16   Ht 1.575 m (5' 2\")   Wt 47.6 kg (105 lb)   SpO2 94%   BMI 19.20 kg/m²  Body mass index is 19.2 kg/m².    Gen: Alert and oriented, No apparent distress.  Neck: Neck is supple without lymphadenopathy.  Lungs: Normal effort, CTA bilaterally, no wheezes, rhonchi, or rales  CV: Regular rate and rhythm. No murmurs, rubs, or gallops.  Ext: No clubbing, cyanosis, edema.  New nodule located over the right first DIP joint tender to palpation no inflammation.  Tender left anterior tibia area.  Consistent with shinsplints.    Labs: None    Assessment & Plan:     66 y.o. female with the following -     Problem List Items Addressed This Visit     Arthritis     Continue NSAIDS for now.  Only other thing is to refer to surgery.  Ortho has already said not to do surgery at this time         Relevant Medications    HYDROcodone-acetaminophen (NORCO) 7.5-325 MG per tablet    " HYDROcodone-acetaminophen (NORCO) 7.5-325 MG per tablet (Start on 3/4/2022)    HYDROcodone-acetaminophen (NORCO) 7.5-325 MG per tablet (Start on 4/1/2022)    Low back pain     Refill pain meds         Relevant Medications    HYDROcodone-acetaminophen (NORCO) 7.5-325 MG per tablet    HYDROcodone-acetaminophen (NORCO) 7.5-325 MG per tablet (Start on 3/4/2022)    HYDROcodone-acetaminophen (NORCO) 7.5-325 MG per tablet (Start on 4/1/2022)    Left medial tibial stress syndrome     Rest.  Massage and NSAIDs as needed                   Return in about 3 months (around 5/4/2022) for med refills.    Please note that this dictation was created using voice recognition software. I have made every reasonable attempt to correct obvious errors, but I expect that there are errors of grammar and possibly content that I did not discover before finalizing the note.

## 2022-02-04 NOTE — ASSESSMENT & PLAN NOTE
Continue NSAIDS for now.  Only other thing is to refer to surgery.  Ortho has already said not to do surgery at this time

## 2022-03-03 RX ORDER — FLUTICASONE PROPIONATE 50 MCG
2 SPRAY, SUSPENSION (ML) NASAL DAILY
Qty: 16 G | Refills: 3 | Status: SHIPPED | OUTPATIENT
Start: 2022-03-03 | End: 2023-05-17 | Stop reason: SDUPTHER

## 2022-03-30 ENCOUNTER — TELEPHONE (OUTPATIENT)
Dept: MEDICAL GROUP | Facility: CLINIC | Age: 67
End: 2022-03-30
Payer: MEDICARE

## 2022-03-30 NOTE — TELEPHONE ENCOUNTER
VOICEMAIL  1. Caller Name: Caden                      Call Back Number: [phone]    2. Message: Pts spouse called regarding a sore on her back for 1 mo. Possibly needing an appointment. Called Caden back and L/M.    3. Patient approves office to leave a detailed voicemail/MyChart message: no

## 2022-04-05 ENCOUNTER — OFFICE VISIT (OUTPATIENT)
Dept: MEDICAL GROUP | Facility: CLINIC | Age: 67
End: 2022-04-05
Payer: MEDICARE

## 2022-04-05 VITALS
BODY MASS INDEX: 18.38 KG/M2 | WEIGHT: 99.9 LBS | HEIGHT: 62 IN | HEART RATE: 80 BPM | SYSTOLIC BLOOD PRESSURE: 124 MMHG | DIASTOLIC BLOOD PRESSURE: 79 MMHG | OXYGEN SATURATION: 95 %

## 2022-04-05 DIAGNOSIS — G89.29 CHRONIC BILATERAL LOW BACK PAIN WITHOUT SCIATICA: ICD-10-CM

## 2022-04-05 DIAGNOSIS — M54.50 CHRONIC BILATERAL LOW BACK PAIN WITHOUT SCIATICA: ICD-10-CM

## 2022-04-05 DIAGNOSIS — L98.9 BENIGN SKIN LESION OF LOWER BACK: ICD-10-CM

## 2022-04-05 PROCEDURE — 99213 OFFICE O/P EST LOW 20 MIN: CPT | Mod: GE | Performed by: STUDENT IN AN ORGANIZED HEALTH CARE EDUCATION/TRAINING PROGRAM

## 2022-04-05 RX ORDER — NALOXONE HYDROCHLORIDE 4 MG/.1ML
SPRAY NASAL
Qty: 1 EACH | Refills: 1 | Status: SHIPPED | OUTPATIENT
Start: 2022-04-05

## 2022-04-05 RX ORDER — HYDROCODONE BITARTRATE AND ACETAMINOPHEN 7.5; 325 MG/1; MG/1
TABLET ORAL
COMMUNITY
Start: 2021-03-05 | End: 2022-04-05

## 2022-04-05 RX ORDER — HYDROCODONE BITARTRATE AND ACETAMINOPHEN 7.5; 325 MG/1; MG/1
1-2 TABLET ORAL EVERY 4 HOURS PRN
Qty: 120 TABLET | Refills: 0 | Status: SHIPPED | OUTPATIENT
Start: 2022-05-03 | End: 2022-04-28

## 2022-04-05 RX ORDER — TRIAMCINOLONE ACETONIDE 1 MG/G
CREAM TOPICAL
COMMUNITY
Start: 2021-08-26 | End: 2022-04-05

## 2022-04-05 RX ORDER — LISINOPRIL AND HYDROCHLOROTHIAZIDE 12.5; 1 MG/1; MG/1
TABLET ORAL
COMMUNITY
Start: 2014-03-22 | End: 2022-04-05

## 2022-04-05 RX ORDER — HYDROCODONE BITARTRATE AND ACETAMINOPHEN 7.5; 325 MG/1; MG/1
1-2 TABLET ORAL EVERY 4 HOURS PRN
Qty: 120 TABLET | Refills: 0 | Status: SHIPPED | OUTPATIENT
Start: 2022-06-01 | End: 2022-04-28 | Stop reason: SDUPTHER

## 2022-04-06 NOTE — ASSESSMENT & PLAN NOTE
Physical exam shows 2 small areas of well-healing skin with slight erythema but no skin breaks and no signs of infection.  Given lack of pain and very well appearing skin, I recommended that the patient use topical vitamin E oil over-the-counter to promote scar healing.

## 2022-04-06 NOTE — PROGRESS NOTES
Subjective:     CC: Norco refills, spot on back    HPI:   Jemima presents today with the above chief complaints    Problem   Benign Skin Lesion of Lower Back    Patient states that a few years ago, she had shingles on her right lumbar/upper gluteal area.  This resolved until about 4 to 5 weeks ago when she began having pain in the area.  She was seen by Dr. Foreman for the same, and prescribed Voltaren gel.  Since that time, she has had no pain.  She wonders whether she needs antibiotics today.     Low Back Pain    Patient with longstanding history of low back pain.  Is normally seen by Dr. Foreman for this, and has been taking Norco 7.5-325 mg tablets for multiple years.  She states she has never had any adverse reaction secondary to this medication, including confusion, falls, respiratory depression.    She and her  state that they have been having a lot of problems with her old pharmacy for several months, so recently changed all of her medications over to a new pharmacy.  They were informed by her old pharmacy that because they have done this, she will no longer be able to refill her [place] prescription at that pharmacy.  (She was able to fill her first 30-day supplies from Dr. Foreman there already but will require 2 more 30-day supply as of May 3 and June 1.)         Current Outpatient Medications Ordered in Epic   Medication Sig Dispense Refill   • [START ON 5/3/2022] HYDROcodone-acetaminophen (NORCO) 7.5-325 MG tab Take 1-2 Tablets by mouth every four hours as needed for up to 28 days. Indications: Pain, Chronic back pain 120 Tablet 0   • [START ON 6/1/2022] HYDROcodone-acetaminophen (NORCO) 7.5-325 MG tab Take 1-2 Tablets by mouth every four hours as needed for up to 28 days. Indications: Pain, Chronic back pain 120 Tablet 0   • Naloxone (NARCAN) 4 MG/0.1ML Liquid One spray in one nostril for overdose and call 911. 1 Each 1   • fluticasone (FLONASE) 50 MCG/ACT nasal spray Administer 2 Sprays into affected  "nostril(S) every day. 16 g 3   • gabapentin (NEURONTIN) 300 MG Cap Take 1 Capsule by mouth 2 times a day. Indications: foot pain 90 Capsule 3   • meloxicam (MOBIC) 15 MG tablet      • lisinopril-hydrochlorothiazide (PRINZIDE) 10-12.5 MG per tablet      • oxybutynin (DITROPAN) 5 MG Tab      • triamcinolone acetonide (KENALOG) 0.1 % Cream      • diclofenac sodium (VOLTAREN) 1 % Gel Apply 2 g topically 4 times a day as needed. 100 g 3   • cyclobenzaprine (FLEXERIL) 10 mg Tab Take 1 Tablet by mouth 3 times a day as needed. 30 Tablet 0     No current Epic-ordered facility-administered medications on file.       Health Maintenance: Deferred    ROS:  Gen: no fevers/chills, no changes in weight  Eyes: no changes in vision  ENT: no sore throat, no hearing loss, no rhinorrhea  Pulm: no sob, no cough  CV: no chest pain, no palpitations  GI: no nausea/vomiting, no diarrhea  : no dysuria  MSk: Chronic low back pain without radiation to the extremities  Skin: See HPI  Neuro: no headaches, no numbness/tingling, no weakness  Heme/Lymph: no easy bruising      Objective:     Exam:  /79   Pulse 80   Ht 1.575 m (5' 2\")   Wt 45.3 kg (99 lb 14.4 oz)   SpO2 95%   BMI 18.27 kg/m²  Body mass index is 18.27 kg/m².    Gen: Alert and oriented, No apparent distress.  Neck: Neck is supple without lymphadenopathy.  Lungs: Normal effort, CTA bilaterally, no wheezes, rhonchi, or rales  CV: Regular rate and rhythm. No murmurs, rubs, or gallops.  Ext: No clubbing, cyanosis, edema.  Integument: There is a well-healing 2 x 4 mm and 2 x 3 mm patch of healthy appearing, slightly erythematous skin to the upper aspect of the right gluteal area.  The skin is completely intact there, and there is no edema or underlying fluctuance.        Assessment & Plan:     67 y.o. female with the following -     Problem List Items Addressed This Visit     Low back pain     Patient educated on the potential side effects of this medication.  Her daily MME is " 45-90 as the prescription is written.  Review of PDMP shows no signs of diversion or misuse.  The patient is a well established patient of another physician in this group.    The patient states she does not have a Narcan prescription at home, so I have ordered this for her in case she has any adverse reactions to this medication.  She will follow with Dr. Foreman for her regularly scheduled visit next month.         Relevant Medications    HYDROcodone-acetaminophen (NORCO) 7.5-325 MG tab (Start on 5/3/2022)    HYDROcodone-acetaminophen (NORCO) 7.5-325 MG tab (Start on 6/1/2022)    Benign skin lesion of lower back     Physical exam shows 2 small areas of well-healing skin with slight erythema but no skin breaks and no signs of infection.  Given lack of pain and very well appearing skin, I recommended that the patient use topical vitamin E oil over-the-counter to promote scar healing.                   No follow-ups on file.  Follow-up with Dr. Foreman as regularly scheduled.

## 2022-04-06 NOTE — ASSESSMENT & PLAN NOTE
Patient educated on the potential side effects of this medication.  Her daily MME is 45-90 as the prescription is written.  Review of PDMP shows no signs of diversion or misuse.  The patient is a well established patient of another physician in this group.    The patient states she does not have a Narcan prescription at home, so I have ordered this for her in case she has any adverse reactions to this medication.  She will follow with Dr. Foreman for her regularly scheduled visit next month.

## 2022-04-15 RX ORDER — LISINOPRIL AND HYDROCHLOROTHIAZIDE 12.5; 1 MG/1; MG/1
1 TABLET ORAL DAILY
Qty: 90 TABLET | Refills: 3 | Status: SHIPPED | OUTPATIENT
Start: 2022-04-15 | End: 2022-12-13

## 2022-04-28 ENCOUNTER — OFFICE VISIT (OUTPATIENT)
Dept: MEDICAL GROUP | Facility: CLINIC | Age: 67
End: 2022-04-28
Payer: MEDICARE

## 2022-04-28 VITALS
SYSTOLIC BLOOD PRESSURE: 113 MMHG | OXYGEN SATURATION: 94 % | BODY MASS INDEX: 18.22 KG/M2 | DIASTOLIC BLOOD PRESSURE: 73 MMHG | HEIGHT: 62 IN | HEART RATE: 83 BPM | WEIGHT: 99 LBS

## 2022-04-28 DIAGNOSIS — I10 PRIMARY HYPERTENSION: ICD-10-CM

## 2022-04-28 DIAGNOSIS — G89.29 CHRONIC BILATERAL LOW BACK PAIN WITHOUT SCIATICA: ICD-10-CM

## 2022-04-28 DIAGNOSIS — M54.50 CHRONIC BILATERAL LOW BACK PAIN WITHOUT SCIATICA: ICD-10-CM

## 2022-04-28 DIAGNOSIS — L98.9 BENIGN SKIN LESION OF LOWER BACK: ICD-10-CM

## 2022-04-28 PROCEDURE — 99214 OFFICE O/P EST MOD 30 MIN: CPT | Performed by: FAMILY MEDICINE

## 2022-04-28 RX ORDER — HYDROCODONE BITARTRATE AND ACETAMINOPHEN 7.5; 325 MG/1; MG/1
1-2 TABLET ORAL EVERY 4 HOURS PRN
Qty: 120 TABLET | Refills: 0 | Status: SHIPPED | OUTPATIENT
Start: 2022-07-27 | End: 2022-08-24

## 2022-04-28 RX ORDER — CYCLOBENZAPRINE HCL 10 MG
10 TABLET ORAL 3 TIMES DAILY PRN
Qty: 30 TABLET | Refills: 0 | Status: SHIPPED | OUTPATIENT
Start: 2022-04-28 | End: 2022-06-06 | Stop reason: SDUPTHER

## 2022-04-28 RX ORDER — HYDROCODONE BITARTRATE AND ACETAMINOPHEN 7.5; 325 MG/1; MG/1
1-2 TABLET ORAL EVERY 4 HOURS PRN
Qty: 120 TABLET | Refills: 0 | Status: SHIPPED | OUTPATIENT
Start: 2022-06-29 | End: 2022-06-06 | Stop reason: SDUPTHER

## 2022-04-28 RX ORDER — HYDROCODONE BITARTRATE AND ACETAMINOPHEN 7.5; 325 MG/1; MG/1
1-2 TABLET ORAL EVERY 4 HOURS PRN
Qty: 120 TABLET | Refills: 0 | Status: SHIPPED | OUTPATIENT
Start: 2022-06-01 | End: 2022-06-29

## 2022-04-28 NOTE — PROGRESS NOTES
Subjective:     CC: Follow-up for shingles and controlled substances refill    HPI:   Jemima presents today with follow-up for shingles.  She had shingles approximately 2 months ago and some postherpetic neuralgia that followed.  She has been treating her shingles with hydrocortisone cream and it seems to resolve now.  She does have some scarring left over.    She also needs a refill of her controlled substances.  She has been on hydrocodone every 4 hours as need be for pain for multiple years.  This is both pain related to her low back and to her feet she has chronic plantar fasciitis as well.  No red flags.    This is also recheck for hypertension.  Her blood pressure is really good today.  No history of chest pain or palpitations.    Problem   Benign Skin Lesion of Lower Back    Patient states that a few years ago, she had shingles on her right lumbar/upper gluteal area.  This resolved until about 4 to 5 weeks ago when she began having pain in the area.  She was seen by Dr. Foreman for the same, and prescribed Voltaren gel.  Since that time, she has had no pain.  She wonders whether she needs antibiotics today.     Htn (Hypertension)    BP doing well     Low Back Pain    Patient with longstanding history of low back pain.  Is normally seen by Dr. Foreman for this, and has been taking Norco 7.5-325 mg tablets for multiple years.  She states she has never had any adverse reaction secondary to this medication, including confusion, falls, respiratory depression.    She and her  state that they have been having a lot of problems with her old pharmacy for several months, so recently changed all of her medications over to a new pharmacy.  They were informed by her old pharmacy that because they have done this, she will no longer be able to refill her [place] prescription at that pharmacy.  (She was able to fill her first 30-day supplies from Dr. Foreman there already but will require 2 more 30-day supply as of May 3  and June 1.)         Current Outpatient Medications Ordered in Epic   Medication Sig Dispense Refill   • [START ON 6/1/2022] HYDROcodone-acetaminophen (NORCO) 7.5-325 MG tab Take 1-2 Tablets by mouth every four hours as needed for Severe Pain for up to 28 days. Indications: Pain, Chronic back pain 120 Tablet 0   • [START ON 6/29/2022] HYDROcodone-acetaminophen (NORCO) 7.5-325 MG tab Take 1-2 Tablets by mouth every four hours as needed for Severe Pain for up to 28 days. Indications: Pain, Chronic back pain 120 Tablet 0   • [START ON 7/27/2022] HYDROcodone-acetaminophen (NORCO) 7.5-325 MG tab Take 1-2 Tablets by mouth every four hours as needed for Severe Pain for up to 28 days. Indications: Pain, Chronic back pain 120 Tablet 0   • cyclobenzaprine (FLEXERIL) 10 mg Tab Take 1 Tablet by mouth 3 times a day as needed for Muscle Spasms. 30 Tablet 0   • lisinopril-hydrochlorothiazide (PRINZIDE) 10-12.5 MG per tablet Take 1 Tablet by mouth every day. 90 Tablet 3   • Naloxone (NARCAN) 4 MG/0.1ML Liquid One spray in one nostril for overdose and call 911. 1 Each 1   • fluticasone (FLONASE) 50 MCG/ACT nasal spray Administer 2 Sprays into affected nostril(S) every day. 16 g 3   • gabapentin (NEURONTIN) 300 MG Cap Take 1 Capsule by mouth 2 times a day. Indications: foot pain 90 Capsule 3   • meloxicam (MOBIC) 15 MG tablet      • oxybutynin (DITROPAN) 5 MG Tab      • triamcinolone acetonide (KENALOG) 0.1 % Cream      • diclofenac sodium (VOLTAREN) 1 % Gel Apply 2 g topically 4 times a day as needed. 100 g 3     No current [facility]-ordered facility-administered medications on file.       Health Maintenance:     ROS:  Gen: no fevers/chills, no changes in weight  Eyes: no changes in vision  ENT: no sore throat, no hearing loss, no bloody nose  Pulm: no sob, no cough  CV: no chest pain, no palpitations  GI: no nausea/vomiting, no diarrhea  : no dysuria  MSk: no myalgias  Skin: no rash  Neuro: no headaches, no  "numbness/tingling  Heme/Lymph: no easy bruising      Objective:     Exam:  /73   Pulse 83   Ht 1.575 m (5' 2\")   Wt 44.9 kg (99 lb)   SpO2 94%   BMI 18.11 kg/m²  Body mass index is 18.11 kg/m².    Gen: Alert and oriented, No apparent distress.  Neck: Neck is supple without lymphadenopathy.  Lungs: Normal effort, CTA bilaterally, no wheezes, rhonchi, or rales  CV: Regular rate and rhythm. No murmurs, rubs, or gallops.  Ext: No clubbing, cyanosis, edema.      Labs: none    Assessment & Plan:     67 y.o. female with the following -     Problem List Items Addressed This Visit     HTN (hypertension)     No change in meds         Low back pain     Refilled gabapentin and hydrocodone  Occasional cyclobenzaprine is helpful         Relevant Medications    HYDROcodone-acetaminophen (NORCO) 7.5-325 MG tab (Start on 6/1/2022)    HYDROcodone-acetaminophen (NORCO) 7.5-325 MG tab (Start on 6/29/2022)    HYDROcodone-acetaminophen (NORCO) 7.5-325 MG tab (Start on 7/27/2022)    cyclobenzaprine (FLEXERIL) 10 mg Tab    Benign skin lesion of lower back     Shingles resolved.  Some scarring                   No follow-ups on file.    Please note that this dictation was created using voice recognition software. I have made every reasonable attempt to correct obvious errors, but I expect that there are errors of grammar and possibly content that I did not discover before finalizing the note.      "

## 2022-05-02 RX ORDER — OMEPRAZOLE 20 MG/1
CAPSULE, DELAYED RELEASE ORAL
Qty: 90 CAPSULE | Refills: 3 | Status: SHIPPED | OUTPATIENT
Start: 2022-05-02

## 2022-05-25 RX ORDER — MELOXICAM 15 MG/1
15 TABLET ORAL DAILY
Qty: 90 TABLET | Refills: 3 | Status: SHIPPED | OUTPATIENT
Start: 2022-05-25 | End: 2022-06-06 | Stop reason: SDUPTHER

## 2022-06-06 ENCOUNTER — OFFICE VISIT (OUTPATIENT)
Dept: MEDICAL GROUP | Facility: CLINIC | Age: 67
End: 2022-06-06
Payer: MEDICARE

## 2022-06-06 VITALS
WEIGHT: 101 LBS | HEART RATE: 81 BPM | OXYGEN SATURATION: 92 % | RESPIRATION RATE: 16 BRPM | BODY MASS INDEX: 18.58 KG/M2 | HEIGHT: 62 IN | SYSTOLIC BLOOD PRESSURE: 117 MMHG | DIASTOLIC BLOOD PRESSURE: 44 MMHG

## 2022-06-06 DIAGNOSIS — G89.29 CHRONIC BILATERAL LOW BACK PAIN WITHOUT SCIATICA: ICD-10-CM

## 2022-06-06 DIAGNOSIS — M75.101 ROTATOR CUFF SYNDROME, RIGHT: ICD-10-CM

## 2022-06-06 DIAGNOSIS — H01.119 CONTACT DERMATITIS OF EYELID, UNSPECIFIED LATERALITY: ICD-10-CM

## 2022-06-06 DIAGNOSIS — M54.50 CHRONIC BILATERAL LOW BACK PAIN WITHOUT SCIATICA: ICD-10-CM

## 2022-06-06 DIAGNOSIS — I10 PRIMARY HYPERTENSION: ICD-10-CM

## 2022-06-06 PROCEDURE — 99214 OFFICE O/P EST MOD 30 MIN: CPT | Mod: 25 | Performed by: FAMILY MEDICINE

## 2022-06-06 PROCEDURE — 20610 DRAIN/INJ JOINT/BURSA W/O US: CPT | Mod: RT | Performed by: FAMILY MEDICINE

## 2022-06-06 RX ORDER — CYCLOBENZAPRINE HCL 10 MG
10 TABLET ORAL 3 TIMES DAILY PRN
Qty: 30 TABLET | Refills: 0 | Status: SHIPPED | OUTPATIENT
Start: 2022-06-06 | End: 2022-06-13

## 2022-06-06 RX ORDER — KETOROLAC TROMETHAMINE 5 MG/ML
1 SOLUTION OPHTHALMIC 4 TIMES DAILY
Qty: 5 ML | Refills: 3 | Status: SHIPPED | OUTPATIENT
Start: 2022-06-06 | End: 2022-10-31 | Stop reason: SDUPTHER

## 2022-06-06 RX ORDER — MELOXICAM 15 MG/1
15 TABLET ORAL DAILY
Qty: 90 TABLET | Refills: 3 | Status: SHIPPED | OUTPATIENT
Start: 2022-06-06 | End: 2022-10-31 | Stop reason: SDUPTHER

## 2022-06-06 RX ORDER — HYDROCODONE BITARTRATE AND ACETAMINOPHEN 7.5; 325 MG/1; MG/1
1-2 TABLET ORAL EVERY 4 HOURS PRN
Qty: 120 TABLET | Refills: 0 | Status: SHIPPED | OUTPATIENT
Start: 2022-07-31 | End: 2022-08-28

## 2022-06-06 RX ORDER — HYDROCODONE BITARTRATE AND ACETAMINOPHEN 7.5; 325 MG/1; MG/1
1-2 TABLET ORAL EVERY 4 HOURS PRN
Qty: 120 TABLET | Refills: 0 | Status: SHIPPED | OUTPATIENT
Start: 2022-07-03 | End: 2022-07-31

## 2022-06-06 RX ORDER — HYDROCODONE BITARTRATE AND ACETAMINOPHEN 7.5; 325 MG/1; MG/1
1-2 TABLET ORAL EVERY 4 HOURS PRN
Qty: 120 TABLET | Refills: 0 | Status: SHIPPED | OUTPATIENT
Start: 2022-08-28 | End: 2022-09-06 | Stop reason: SDUPTHER

## 2022-06-06 NOTE — PROGRESS NOTES
Subjective:     CC: Medication refill and shoulder pain    HPI:   Jemima presents today with medication refill and shoulder pain.  She has had a fair amount of trouble with her hydrocodone refills but is about ready for another refill.  Her back pain has been manageable with the hydrocodone.  She is also seeing pain management who just did x-rays after doing a couple of epidurals without relief.    Jemima also has issues with right shoulder pain she has had previous injections with cortisone and had really good relief.  She is asking for another cortisone injection.  She is taking meloxicam 15 mg daily without relief.  She does complain about pain more in the supraspinatus muscle area rather than the supraspinatus tendon area.  Realizing that she request to go ahead with the injection.    Jemima has also had issues with allergic blepharitis.  She responds well to Acular.  She is requesting a refill of her Acular.    Problem   Allergic Blepharitis    Bilateral blepharitis.  Responded to acular     Htn (Hypertension)    BP doing well     Rotator Cuff Syndrome, Right    Failed NSAIDs and would like injection     Low Back Pain    Patient with longstanding history of low back pain.  Is normally seen by Dr. Foreman for this, and has been taking Norco 7.5-325 mg tablets for multiple years.  She states she has never had any adverse reaction secondary to this medication, including confusion, falls, respiratory depression.    She and her  state that they have been having a lot of problems with her old pharmacy for several months, so recently changed all of her medications over to a new pharmacy.  They were informed by her old pharmacy that because they have done this, she will no longer be able to refill her [place] prescription at that pharmacy.  (She was able to fill her first 30-day supplies from Dr. Foreman there already but will require 2 more 30-day supply as of May 3 and June 1.)         Current Outpatient Medications  Ordered in [facility]   Medication Sig Dispense Refill   • [START ON 7/3/2022] HYDROcodone-acetaminophen (NORCO) 7.5-325 MG tab Take 1-2 Tablets by mouth every four hours as needed for Severe Pain for up to 28 days. Indications: Pain, Chronic back pain 120 Tablet 0   • [START ON 7/31/2022] HYDROcodone-acetaminophen (NORCO) 7.5-325 MG tab Take 1-2 Tablets by mouth every four hours as needed for Severe Pain for up to 28 days. Indications: Pain, Chronic back pain 120 Tablet 0   • [START ON 8/28/2022] HYDROcodone-acetaminophen (NORCO) 7.5-325 MG tab Take 1-2 Tablets by mouth every four hours as needed for Severe Pain for up to 28 days. Indications: Pain, Chronic back pain 120 Tablet 0   • meloxicam (MOBIC) 15 MG tablet Take 1 Tablet by mouth every day. 90 Tablet 3   • cyclobenzaprine (FLEXERIL) 10 mg Tab Take 1 Tablet by mouth 3 times a day as needed for Muscle Spasms. 30 Tablet 0   • ketorolac (ACULAR) 0.5 % Solution Administer 1 Drop into both eyes 4 times a day. 5 mL 3   • omeprazole (PRILOSEC) 20 MG delayed-release capsule TAKE 1 CAPSULE BY MOUTH EVERY DAY 90 Capsule 3   • HYDROcodone-acetaminophen (NORCO) 7.5-325 MG tab Take 1-2 Tablets by mouth every four hours as needed for Severe Pain for up to 28 days. Indications: Pain, Chronic back pain 120 Tablet 0   • [START ON 7/27/2022] HYDROcodone-acetaminophen (NORCO) 7.5-325 MG tab Take 1-2 Tablets by mouth every four hours as needed for Severe Pain for up to 28 days. Indications: Pain, Chronic back pain 120 Tablet 0   • lisinopril-hydrochlorothiazide (PRINZIDE) 10-12.5 MG per tablet Take 1 Tablet by mouth every day. 90 Tablet 3   • Naloxone (NARCAN) 4 MG/0.1ML Liquid One spray in one nostril for overdose and call 911. 1 Each 1   • fluticasone (FLONASE) 50 MCG/ACT nasal spray Administer 2 Sprays into affected nostril(S) every day. 16 g 3   • gabapentin (NEURONTIN) 300 MG Cap Take 1 Capsule by mouth 2 times a day. Indications: foot pain 90 Capsule 3   • oxybutynin (DITROPAN)  "5 MG Tab      • triamcinolone acetonide (KENALOG) 0.1 % Cream      • diclofenac sodium (VOLTAREN) 1 % Gel Apply 2 g topically 4 times a day as needed. 100 g 3     No current Epic-ordered facility-administered medications on file.       Health Maintenance:     ROS:  Gen: no fevers/chills, no changes in weight  Eyes: no changes in vision  ENT: no sore throat, no hearing loss, no bloody nose  Pulm: no sob, no cough  CV: no chest pain, no palpitations  GI: no nausea/vomiting, no diarrhea  : no dysuria  MSk: no myalgias  Skin: no rash  Neuro: no headaches, no numbness/tingling  Heme/Lymph: no easy bruising      Objective:     Exam:  /44 (BP Location: Right arm, Patient Position: Sitting, BP Cuff Size: Adult)   Pulse 81   Resp 16   Ht 1.575 m (5' 2\")   Wt 45.8 kg (101 lb)   SpO2 92%   BMI 18.47 kg/m²  Body mass index is 18.47 kg/m².    Gen: Alert and oriented, No apparent distress.  Neck: Neck is supple without lymphadenopathy.  Lungs: Normal effort, CTA bilaterally, no wheezes, rhonchi, or rales  CV: Regular rate and rhythm. No murmurs, rubs, or gallops.  Ext: No clubbing, cyanosis, edema.  Good range of motion of her right shoulder.  No crepitance.  Pain at insertion of supraspinatus tendon.  A fair amount of spasm and pain located in the supraspinatus muscle.      Labs: none    Assessment & Plan:     67 y.o. female with the following -     Problem List Items Addressed This Visit     HTN (hypertension)     Continue current treatment           Low back pain     Continue with pain management           Relevant Medications    HYDROcodone-acetaminophen (NORCO) 7.5-325 MG tab (Start on 7/3/2022)    HYDROcodone-acetaminophen (NORCO) 7.5-325 MG tab (Start on 7/31/2022)    HYDROcodone-acetaminophen (NORCO) 7.5-325 MG tab (Start on 8/28/2022)    meloxicam (MOBIC) 15 MG tablet    cyclobenzaprine (FLEXERIL) 10 mg Tab    Rotator cuff syndrome, right     Main pain on SS tendon.  Cortisone injection           Relevant " Orders    Consent for all Surgical, Special Diagnostic or Therapeutic Procedures    Allergic blepharitis     Acular refill                     No follow-ups on file.    Please note that this dictation was created using voice recognition software. I have made every reasonable attempt to correct obvious errors, but I expect that there are errors of grammar and possibly content that I did not discover before finalizing the note.

## 2022-06-06 NOTE — PROCEDURES
After proper prepping of the right posterior shoulder, a subacromial injection was accomplished using 40 mg of triamcinolone and 2 cc of 1% Xylocaine.  Patient tolerated procedure well

## 2022-06-07 RX ORDER — GABAPENTIN 300 MG/1
300 CAPSULE ORAL 2 TIMES DAILY
Qty: 180 CAPSULE | Refills: 3 | Status: SHIPPED | OUTPATIENT
Start: 2022-06-07 | End: 2022-10-31 | Stop reason: SDUPTHER

## 2022-06-07 RX ORDER — OXYBUTYNIN CHLORIDE 5 MG/1
5 TABLET ORAL NIGHTLY
Qty: 90 TABLET | Refills: 3 | Status: SHIPPED | OUTPATIENT
Start: 2022-06-07 | End: 2022-07-06 | Stop reason: SDUPTHER

## 2022-06-13 RX ORDER — CYCLOBENZAPRINE HCL 10 MG
TABLET ORAL
Qty: 270 TABLET | Refills: 3 | Status: SHIPPED | OUTPATIENT
Start: 2022-06-13 | End: 2023-07-05

## 2022-07-06 RX ORDER — OXYBUTYNIN CHLORIDE 5 MG/1
5 TABLET ORAL NIGHTLY
Qty: 90 TABLET | Refills: 3 | Status: SHIPPED | OUTPATIENT
Start: 2022-07-06 | End: 2023-04-26

## 2022-09-06 ENCOUNTER — OFFICE VISIT (OUTPATIENT)
Dept: MEDICAL GROUP | Facility: CLINIC | Age: 67
End: 2022-09-06
Payer: MEDICARE

## 2022-09-06 VITALS
BODY MASS INDEX: 19.16 KG/M2 | HEIGHT: 60 IN | WEIGHT: 97.6 LBS | OXYGEN SATURATION: 93 % | HEART RATE: 85 BPM | DIASTOLIC BLOOD PRESSURE: 63 MMHG | SYSTOLIC BLOOD PRESSURE: 119 MMHG

## 2022-09-06 DIAGNOSIS — G89.29 CHRONIC BILATERAL LOW BACK PAIN WITHOUT SCIATICA: ICD-10-CM

## 2022-09-06 DIAGNOSIS — M54.50 CHRONIC BILATERAL LOW BACK PAIN WITHOUT SCIATICA: ICD-10-CM

## 2022-09-06 DIAGNOSIS — M72.2 PLANTAR FASCIITIS: ICD-10-CM

## 2022-09-06 DIAGNOSIS — I10 PRIMARY HYPERTENSION: ICD-10-CM

## 2022-09-06 PROCEDURE — 99214 OFFICE O/P EST MOD 30 MIN: CPT | Performed by: FAMILY MEDICINE

## 2022-09-06 RX ORDER — HYDROCODONE BITARTRATE AND ACETAMINOPHEN 7.5; 325 MG/1; MG/1
1-2 TABLET ORAL EVERY 4 HOURS PRN
Qty: 120 TABLET | Refills: 0 | Status: SHIPPED | OUTPATIENT
Start: 2022-09-06 | End: 2022-10-06

## 2022-09-06 RX ORDER — HYDROCODONE BITARTRATE AND ACETAMINOPHEN 7.5; 325 MG/1; MG/1
1-2 TABLET ORAL EVERY 4 HOURS PRN
Qty: 120 TABLET | Refills: 0 | Status: SHIPPED | OUTPATIENT
Start: 2022-11-05 | End: 2022-12-05

## 2022-09-06 RX ORDER — HYDROCODONE BITARTRATE AND ACETAMINOPHEN 7.5; 325 MG/1; MG/1
1-2 TABLET ORAL EVERY 4 HOURS PRN
Qty: 120 TABLET | Refills: 0 | Status: SHIPPED | OUTPATIENT
Start: 2022-10-06 | End: 2022-10-31 | Stop reason: SDUPTHER

## 2022-09-06 NOTE — PROGRESS NOTES
Subjective:     CC: Narcotic medicine refills    HPI:   Jemima presents today with a need for narcotic medicine refill.  She is doing very well.  She just had PRP injections for her feet for chronic chronic [place] fasciitis.  She has multiple complaints of pain in her bilateral buttock area and her neck and her right shoulder.    She has pain medicine specialist.  He just recently did epidural injection.    Her blood pressure now is good.  She has had no chest pain or palpitations.    Problem   Htn (Hypertension)    BP doing well     Low Back Pain    Patient with longstanding history of low back pain.  Is normally seen by Dr. Foreman for this, and has been taking Norco 7.5-325 mg tablets for multiple years.  She states she has never had any adverse reaction secondary to this medication, including confusion, falls, respiratory depression.    She and her  state that they have been having a lot of problems with her old pharmacy for several months, so recently changed all of her medications over to a new pharmacy.  They were informed by her old pharmacy that because they have done this, she will no longer be able to refill her [place] prescription at that pharmacy.  (She was able to fill her first 30-day supplies from Dr. Foreman there already but will require 2 more 30-day supply as of May 3 and June 1.)     Plantar Fasciitis    Just got PRP treatment of both feet.  Helped a lot         Current Outpatient Medications Ordered in Epic   Medication Sig Dispense Refill    HYDROcodone-acetaminophen (NORCO) 7.5-325 MG tab Take 1-2 Tablets by mouth every four hours as needed for Severe Pain for up to 30 days. Indications: Pain, Chronic back pain 120 Tablet 0    [START ON 10/6/2022] HYDROcodone-acetaminophen (NORCO) 7.5-325 MG tab Take 1-2 Tablets by mouth every four hours as needed for Severe Pain for up to 30 days. Indications: Pain, Chronic back pain 120 Tablet 0    [START ON 11/5/2022] HYDROcodone-acetaminophen (NORCO)  7.5-325 MG tab Take 1-2 Tablets by mouth every four hours as needed for Severe Pain for up to 30 days. Indications: Pain, Chronic back pain 120 Tablet 0    oxybutynin (DITROPAN) 5 MG Tab Take 1 Tablet by mouth every evening. 90 Tablet 3    cyclobenzaprine (FLEXERIL) 10 mg Tab TAKE 1 TABLET BY MOUTH THREE TIMES DAILY AS NEEDED FOR MUSCLE SPASMS 270 Tablet 3    gabapentin (NEURONTIN) 300 MG Cap Take 1 Capsule by mouth 2 times a day. Indications: foot pain 180 Capsule 3    meloxicam (MOBIC) 15 MG tablet Take 1 Tablet by mouth every day. 90 Tablet 3    ketorolac (ACULAR) 0.5 % Solution Administer 1 Drop into both eyes 4 times a day. 5 mL 3    omeprazole (PRILOSEC) 20 MG delayed-release capsule TAKE 1 CAPSULE BY MOUTH EVERY DAY 90 Capsule 3    lisinopril-hydrochlorothiazide (PRINZIDE) 10-12.5 MG per tablet Take 1 Tablet by mouth every day. 90 Tablet 3    Naloxone (NARCAN) 4 MG/0.1ML Liquid One spray in one nostril for overdose and call 911. 1 Each 1    fluticasone (FLONASE) 50 MCG/ACT nasal spray Administer 2 Sprays into affected nostril(S) every day. 16 g 3    triamcinolone acetonide (KENALOG) 0.1 % Cream       diclofenac sodium (VOLTAREN) 1 % Gel Apply 2 g topically 4 times a day as needed. 100 g 3     No current [facility]-ordered facility-administered medications on file.       Health Maintenance:     ROS:  Gen: no fevers/chills, no changes in weight  Eyes: no changes in vision  ENT: no sore throat, no hearing loss, no bloody nose  Pulm: no sob, no cough  CV: no chest pain, no palpitations  GI: no nausea/vomiting, no diarrhea  : no dysuria  MSk: no myalgias  Skin: no rash  Neuro: no headaches, no numbness/tingling  Heme/Lymph: no easy bruising      Objective:     Exam:  /63 (BP Location: Right arm, Patient Position: Sitting, BP Cuff Size: Adult)   Pulse 85   Ht 1.524 m (5')   Wt 44.3 kg (97 lb 9.6 oz)   SpO2 93%   BMI 19.06 kg/m²  Body mass index is 19.06 kg/m².    Gen: Alert and oriented, No apparent  distress.  Neck: Neck is supple without lymphadenopathy.  Lungs: Normal effort, CTA bilaterally, no wheezes, rhonchi, or rales  CV: Regular rate and rhythm. No murmurs, rubs, or gallops.  Ext: No clubbing, cyanosis, edema.      Labs: None    Assessment & Plan:     67 y.o. female with the following -     Problem List Items Addressed This Visit       Plantar fasciitis     As per podiatry         HTN (hypertension)     Continue current meds         Low back pain     OK to refill meds         Relevant Medications    HYDROcodone-acetaminophen (NORCO) 7.5-325 MG tab    HYDROcodone-acetaminophen (NORCO) 7.5-325 MG tab (Start on 10/6/2022)    HYDROcodone-acetaminophen (NORCO) 7.5-325 MG tab (Start on 11/5/2022)           Return in about 3 months (around 12/6/2022) for med refills.    Please note that this dictation was created using voice recognition software. I have made every reasonable attempt to correct obvious errors, but I expect that there are errors of grammar and possibly content that I did not discover before finalizing the note.

## 2022-10-31 ENCOUNTER — OFFICE VISIT (OUTPATIENT)
Dept: MEDICAL GROUP | Facility: CLINIC | Age: 67
End: 2022-10-31
Payer: MEDICARE

## 2022-10-31 VITALS
BODY MASS INDEX: 18.03 KG/M2 | OXYGEN SATURATION: 94 % | HEART RATE: 81 BPM | RESPIRATION RATE: 16 BRPM | WEIGHT: 98 LBS | HEIGHT: 62 IN | DIASTOLIC BLOOD PRESSURE: 74 MMHG | SYSTOLIC BLOOD PRESSURE: 125 MMHG

## 2022-10-31 DIAGNOSIS — G89.4 CHRONIC PAIN SYNDROME: ICD-10-CM

## 2022-10-31 DIAGNOSIS — I10 PRIMARY HYPERTENSION: ICD-10-CM

## 2022-10-31 DIAGNOSIS — M75.101 ROTATOR CUFF SYNDROME, RIGHT: ICD-10-CM

## 2022-10-31 DIAGNOSIS — M54.50 CHRONIC BILATERAL LOW BACK PAIN WITHOUT SCIATICA: ICD-10-CM

## 2022-10-31 DIAGNOSIS — Z00.00 HEALTHCARE MAINTENANCE: ICD-10-CM

## 2022-10-31 DIAGNOSIS — G89.29 CHRONIC BILATERAL LOW BACK PAIN WITHOUT SCIATICA: ICD-10-CM

## 2022-10-31 PROCEDURE — G0008 ADMIN INFLUENZA VIRUS VAC: HCPCS | Performed by: FAMILY MEDICINE

## 2022-10-31 PROCEDURE — 91313 MODERNA SARS-COV-2 VACCINE BIVALENT BOOSTER (12+): CPT | Performed by: FAMILY MEDICINE

## 2022-10-31 PROCEDURE — 20610 DRAIN/INJ JOINT/BURSA W/O US: CPT | Mod: RT | Performed by: FAMILY MEDICINE

## 2022-10-31 PROCEDURE — 90662 IIV NO PRSV INCREASED AG IM: CPT | Performed by: FAMILY MEDICINE

## 2022-10-31 PROCEDURE — 99214 OFFICE O/P EST MOD 30 MIN: CPT | Mod: 25 | Performed by: FAMILY MEDICINE

## 2022-10-31 PROCEDURE — 0134A MODERNA SARS-COV-2 VACCINE BIVALENT BOOSTER (12+): CPT | Performed by: FAMILY MEDICINE

## 2022-10-31 RX ORDER — HYDROCODONE BITARTRATE AND ACETAMINOPHEN 7.5; 325 MG/1; MG/1
1-2 TABLET ORAL EVERY 4 HOURS PRN
Qty: 120 TABLET | Refills: 0 | Status: SHIPPED | OUTPATIENT
Start: 2023-01-04 | End: 2023-01-18 | Stop reason: SDUPTHER

## 2022-10-31 RX ORDER — GABAPENTIN 300 MG/1
300 CAPSULE ORAL 2 TIMES DAILY
Qty: 180 CAPSULE | Refills: 3 | Status: SHIPPED | OUTPATIENT
Start: 2022-10-31 | End: 2023-01-18 | Stop reason: SDUPTHER

## 2022-10-31 RX ORDER — MELOXICAM 15 MG/1
15 TABLET ORAL DAILY
Qty: 90 TABLET | Refills: 3 | Status: SHIPPED | OUTPATIENT
Start: 2022-10-31 | End: 2023-08-29 | Stop reason: SDUPTHER

## 2022-10-31 RX ORDER — KETOROLAC TROMETHAMINE 5 MG/ML
1 SOLUTION OPHTHALMIC 4 TIMES DAILY
Qty: 5 ML | Refills: 3 | Status: SHIPPED | OUTPATIENT
Start: 2022-10-31 | End: 2023-01-18 | Stop reason: SDUPTHER

## 2022-10-31 RX ORDER — TRIAMCINOLONE ACETONIDE 40 MG/ML
40 INJECTION, SUSPENSION INTRA-ARTICULAR; INTRAMUSCULAR ONCE
Status: COMPLETED | OUTPATIENT
Start: 2022-10-31 | End: 2022-10-31

## 2022-10-31 RX ORDER — HYDROCODONE BITARTRATE AND ACETAMINOPHEN 7.5; 325 MG/1; MG/1
1-2 TABLET ORAL EVERY 4 HOURS PRN
Qty: 120 TABLET | Refills: 0 | Status: SHIPPED | OUTPATIENT
Start: 2022-11-04 | End: 2022-12-04

## 2022-10-31 RX ORDER — HYDROCODONE BITARTRATE AND ACETAMINOPHEN 7.5; 325 MG/1; MG/1
1-2 TABLET ORAL EVERY 4 HOURS PRN
Qty: 120 TABLET | Refills: 0 | Status: SHIPPED | OUTPATIENT
Start: 2022-12-04 | End: 2023-01-03

## 2022-10-31 RX ADMIN — TRIAMCINOLONE ACETONIDE 40 MG: 40 INJECTION, SUSPENSION INTRA-ARTICULAR; INTRAMUSCULAR at 16:47

## 2022-10-31 NOTE — PROGRESS NOTES
Subjective:     CC: Need for vaccines, right shoulder pain, narcotic refill    HPI:   Jemima presents today with a need for medication refills.  She also needs both her flu shot and omicron COVID by Valent vaccination.  She also has complaints of right shoulder pain that has recurred since her last injection in April.    Her pain management has been good.  She has been taking her medication pretty religiously 4 times a day.  And she feels she can do with a refill.    Problem   Chronic Pain Syndrome    About the same.  Needs med refill     Htn (Hypertension)    BP doing well     Rotator Cuff Syndrome, Right    Failed NSAIDs and would like injection    Would like another joint injection         Current Outpatient Medications Ordered in Epic   Medication Sig Dispense Refill    ketorolac (ACULAR) 0.5 % Solution Administer 1 Drop into both eyes 4 times a day. 5 mL 3    [START ON 11/4/2022] HYDROcodone-acetaminophen (NORCO) 7.5-325 MG tab Take 1-2 Tablets by mouth every four hours as needed for Severe Pain for up to 30 days. Indications: Pain, Chronic back pain 120 Tablet 0    [START ON 12/4/2022] HYDROcodone-acetaminophen (NORCO) 7.5-325 MG tab Take 1-2 Tablets by mouth every four hours as needed for Severe Pain for up to 30 days. Indications: Pain, Chronic back pain 120 Tablet 0    [START ON 1/4/2023] HYDROcodone-acetaminophen (NORCO) 7.5-325 MG tab Take 1-2 Tablets by mouth every four hours as needed for Severe Pain for up to 30 days. Indications: Pain, Chronic back pain 120 Tablet 0    meloxicam (MOBIC) 15 MG tablet Take 1 Tablet by mouth every day. 90 Tablet 3    gabapentin (NEURONTIN) 300 MG Cap Take 1 Capsule by mouth 2 times a day. Indications: foot pain 180 Capsule 3    [START ON 11/5/2022] HYDROcodone-acetaminophen (NORCO) 7.5-325 MG tab Take 1-2 Tablets by mouth every four hours as needed for Severe Pain for up to 30 days. Indications: Pain, Chronic back pain 120 Tablet 0    oxybutynin (DITROPAN) 5 MG Tab Take 1  "Tablet by mouth every evening. 90 Tablet 3    cyclobenzaprine (FLEXERIL) 10 mg Tab TAKE 1 TABLET BY MOUTH THREE TIMES DAILY AS NEEDED FOR MUSCLE SPASMS 270 Tablet 3    omeprazole (PRILOSEC) 20 MG delayed-release capsule TAKE 1 CAPSULE BY MOUTH EVERY DAY 90 Capsule 3    lisinopril-hydrochlorothiazide (PRINZIDE) 10-12.5 MG per tablet Take 1 Tablet by mouth every day. 90 Tablet 3    Naloxone (NARCAN) 4 MG/0.1ML Liquid One spray in one nostril for overdose and call 911. 1 Each 1    fluticasone (FLONASE) 50 MCG/ACT nasal spray Administer 2 Sprays into affected nostril(S) every day. 16 g 3    triamcinolone acetonide (KENALOG) 0.1 % Cream       diclofenac sodium (VOLTAREN) 1 % Gel Apply 2 g topically 4 times a day as needed. 100 g 3     No current [facility]-ordered facility-administered medications on file.       Health Maintenance:     ROS:  Gen: no fevers/chills, no changes in weight  Eyes: no changes in vision  ENT: no sore throat, no hearing loss, no bloody nose  Pulm: no sob, no cough  CV: no chest pain, no palpitations  GI: no nausea/vomiting, no diarrhea  : no dysuria  MSk: no myalgias  Skin: no rash  Neuro: no headaches, no numbness/tingling  Heme/Lymph: no easy bruising      Objective:     Exam:  /74 (BP Location: Left arm, Patient Position: Sitting, BP Cuff Size: Adult)   Pulse 81   Resp 16   Ht 1.575 m (5' 2\")   Wt 44.5 kg (98 lb)   SpO2 94%   BMI 17.92 kg/m²  Body mass index is 17.92 kg/m².    Gen: Alert and oriented, No apparent distress.  Neck: Neck is supple without lymphadenopathy.  Lungs: Normal effort, CTA bilaterally, no wheezes, rhonchi, or rales  CV: Regular rate and rhythm. No murmurs, rubs, or gallops.  Ext: No clubbing, cyanosis, edema.  Diminished range of motion of the right shoulder.  All tendons are tender in the right shoulder.  She has a negative apprehension sign however.      Labs: None    Assessment & Plan:     67 y.o. female with the following -     Problem List Items Addressed " This Visit       Chronic pain syndrome     Refill meds         Relevant Medications    HYDROcodone-acetaminophen (NORCO) 7.5-325 MG tab (Start on 11/4/2022)    HYDROcodone-acetaminophen (NORCO) 7.5-325 MG tab (Start on 12/4/2022)    HYDROcodone-acetaminophen (NORCO) 7.5-325 MG tab (Start on 1/4/2023)    meloxicam (MOBIC) 15 MG tablet    gabapentin (NEURONTIN) 300 MG Cap    HTN (hypertension)     Continue current meds         Low back pain    Relevant Medications    HYDROcodone-acetaminophen (NORCO) 7.5-325 MG tab (Start on 11/4/2022)    HYDROcodone-acetaminophen (NORCO) 7.5-325 MG tab (Start on 12/4/2022)    HYDROcodone-acetaminophen (NORCO) 7.5-325 MG tab (Start on 1/4/2023)    meloxicam (MOBIC) 15 MG tablet    Rotator cuff syndrome, right     Cortisone injection to right  Subacromial bursa          Other Visit Diagnoses       Healthcare maintenance        Relevant Orders    Moderna SARS-COV-2 Vaccine Bivalent Booster (12+)    INFLUENZA VACCINE, HIGH DOSE (65+ ONLY)                Return in about 3 months (around 1/31/2023) for med refill.    Please note that this dictation was created using voice recognition software. I have made every reasonable attempt to correct obvious errors, but I expect that there are errors of grammar and possibly content that I did not discover before finalizing the note.

## 2022-10-31 NOTE — PROCEDURES
Patient with a diagnosis of right rotator cuff syndrome.  A subacromial injection was performed.  The subacromial space was identified and the space was prepped in the usual fashion.  An injection of 40 mg of triamcinolone with lidocaine was injected.  The patient tolerated the procedure well.

## 2022-11-11 ENCOUNTER — PATIENT MESSAGE (OUTPATIENT)
Dept: HEALTH INFORMATION MANAGEMENT | Facility: OTHER | Age: 67
End: 2022-11-11

## 2022-12-13 RX ORDER — LISINOPRIL AND HYDROCHLOROTHIAZIDE 12.5; 1 MG/1; MG/1
1 TABLET ORAL DAILY
Qty: 90 TABLET | Refills: 3 | Status: SHIPPED | OUTPATIENT
Start: 2022-12-13 | End: 2023-05-17 | Stop reason: SDUPTHER

## 2022-12-13 RX ORDER — LISINOPRIL AND HYDROCHLOROTHIAZIDE 12.5; 1 MG/1; MG/1
1 TABLET ORAL DAILY
Qty: 90 TABLET | Refills: 3 | Status: SHIPPED | OUTPATIENT
Start: 2022-12-13 | End: 2023-01-18 | Stop reason: SDUPTHER

## 2023-01-18 ENCOUNTER — OFFICE VISIT (OUTPATIENT)
Dept: MEDICAL GROUP | Facility: CLINIC | Age: 68
End: 2023-01-18
Payer: MEDICARE

## 2023-01-18 VITALS
HEART RATE: 82 BPM | RESPIRATION RATE: 17 BRPM | OXYGEN SATURATION: 95 % | DIASTOLIC BLOOD PRESSURE: 78 MMHG | WEIGHT: 99 LBS | TEMPERATURE: 98 F | BODY MASS INDEX: 18.22 KG/M2 | HEIGHT: 62 IN | SYSTOLIC BLOOD PRESSURE: 116 MMHG

## 2023-01-18 DIAGNOSIS — H04.123 DRY EYES: ICD-10-CM

## 2023-01-18 DIAGNOSIS — G89.29 CHRONIC BILATERAL LOW BACK PAIN WITHOUT SCIATICA: ICD-10-CM

## 2023-01-18 DIAGNOSIS — M54.50 CHRONIC BILATERAL LOW BACK PAIN WITHOUT SCIATICA: ICD-10-CM

## 2023-01-18 DIAGNOSIS — H01.119 CONTACT DERMATITIS OF EYELID, UNSPECIFIED LATERALITY: ICD-10-CM

## 2023-01-18 DIAGNOSIS — I10 PRIMARY HYPERTENSION: ICD-10-CM

## 2023-01-18 PROCEDURE — 99214 OFFICE O/P EST MOD 30 MIN: CPT | Performed by: FAMILY MEDICINE

## 2023-01-18 RX ORDER — GABAPENTIN 300 MG/1
300 CAPSULE ORAL 2 TIMES DAILY
Qty: 180 CAPSULE | Refills: 3 | Status: SHIPPED | OUTPATIENT
Start: 2023-01-18 | End: 2023-03-29 | Stop reason: SDUPTHER

## 2023-01-18 RX ORDER — HYDROCODONE BITARTRATE AND ACETAMINOPHEN 7.5; 325 MG/1; MG/1
1-2 TABLET ORAL EVERY 4 HOURS PRN
Qty: 120 TABLET | Refills: 0 | Status: SHIPPED | OUTPATIENT
Start: 2023-02-03 | End: 2023-03-05

## 2023-01-18 RX ORDER — LISINOPRIL AND HYDROCHLOROTHIAZIDE 12.5; 1 MG/1; MG/1
1 TABLET ORAL DAILY
Qty: 90 TABLET | Refills: 3 | Status: SHIPPED | OUTPATIENT
Start: 2023-01-18 | End: 2023-12-30

## 2023-01-18 RX ORDER — HYDROCODONE BITARTRATE AND ACETAMINOPHEN 7.5; 325 MG/1; MG/1
1-2 TABLET ORAL EVERY 4 HOURS PRN
Qty: 120 TABLET | Refills: 0 | Status: SHIPPED | OUTPATIENT
Start: 2023-04-04 | End: 2023-03-29 | Stop reason: SDUPTHER

## 2023-01-18 RX ORDER — HYDROCODONE BITARTRATE AND ACETAMINOPHEN 7.5; 325 MG/1; MG/1
1-2 TABLET ORAL EVERY 4 HOURS PRN
Qty: 120 TABLET | Refills: 0 | Status: SHIPPED | OUTPATIENT
Start: 2023-03-05 | End: 2023-04-04

## 2023-01-18 RX ORDER — KETOROLAC TROMETHAMINE 5 MG/ML
1 SOLUTION OPHTHALMIC 4 TIMES DAILY
Qty: 5 ML | Refills: 3 | Status: SHIPPED | OUTPATIENT
Start: 2023-01-18 | End: 2023-05-17 | Stop reason: SDUPTHER

## 2023-01-18 ASSESSMENT — PATIENT HEALTH QUESTIONNAIRE - PHQ9: CLINICAL INTERPRETATION OF PHQ2 SCORE: 0

## 2023-01-18 NOTE — PROGRESS NOTES
Subjective:     CC: Pain med refills    HPI:   Jemima presents today with a need for refills on her narcotics.  She complains of chronic back pain that seems to be worse over the holiday period.  She has no radiculopathy.  She does have foot pain in addition.  This is basically the same as before and not particularly getting worse.  She has seen a podiatrist.    Is needing refills of her blood pressure medicine.  She has not had any palpitations or chest pain.    Her eyes have been bothering her lately.  Complains of being dry eyes.  She has not had an ophthalmologic evaluation for very long time.    Problem   Allergic Blepharitis    Bilateral blepharitis.  Responded to acular     Htn (Hypertension)    BP doing well     Low Back Pain    Patient with longstanding history of low back pain.  Is normally seen by Dr. Foreman for this, and has been taking Norco 7.5-325 mg tablets for multiple years.  She states she has never had any adverse reaction secondary to this medication, including confusion, falls, respiratory depression.    Back pain somewhat worse over the holidays    Needs norco refill         Current Outpatient Medications Ordered in Epic   Medication Sig Dispense Refill    [START ON 2/3/2023] HYDROcodone-acetaminophen (NORCO) 7.5-325 MG tab Take 1-2 Tablets by mouth every four hours as needed for Severe Pain for up to 30 days. Indications: Pain, Chronic back pain 120 Tablet 0    [START ON 3/5/2023] HYDROcodone-acetaminophen (NORCO) 7.5-325 MG tab Take 1-2 Tablets by mouth every four hours as needed for Severe Pain for up to 30 days. Indications: Pain, Chronic back pain 120 Tablet 0    [START ON 4/4/2023] HYDROcodone-acetaminophen (NORCO) 7.5-325 MG tab Take 1-2 Tablets by mouth every four hours as needed for Severe Pain for up to 30 days. Indications: Pain, Chronic back pain 120 Tablet 0    gabapentin (NEURONTIN) 300 MG Cap Take 1 Capsule by mouth 2 times a day. Indications: foot pain 180 Capsule 3     "lisinopril-hydrochlorothiazide (PRINZIDE) 10-12.5 MG per tablet Take 1 Tablet by mouth every day. 90 Tablet 3    ketorolac (ACULAR) 0.5 % Solution Administer 1 Drop into both eyes 4 times a day. 5 mL 3    lisinopril-hydrochlorothiazide (PRINZIDE) 10-12.5 MG per tablet TAKE 1 TABLET BY MOUTH EVERY DAY 90 Tablet 3    meloxicam (MOBIC) 15 MG tablet Take 1 Tablet by mouth every day. 90 Tablet 3    oxybutynin (DITROPAN) 5 MG Tab Take 1 Tablet by mouth every evening. 90 Tablet 3    cyclobenzaprine (FLEXERIL) 10 mg Tab TAKE 1 TABLET BY MOUTH THREE TIMES DAILY AS NEEDED FOR MUSCLE SPASMS 270 Tablet 3    omeprazole (PRILOSEC) 20 MG delayed-release capsule TAKE 1 CAPSULE BY MOUTH EVERY DAY 90 Capsule 3    Naloxone (NARCAN) 4 MG/0.1ML Liquid One spray in one nostril for overdose and call 911. 1 Each 1    fluticasone (FLONASE) 50 MCG/ACT nasal spray Administer 2 Sprays into affected nostril(S) every day. 16 g 3    triamcinolone acetonide (KENALOG) 0.1 % Cream       diclofenac sodium (VOLTAREN) 1 % Gel Apply 2 g topically 4 times a day as needed. 100 g 3     No current [facility]-ordered facility-administered medications on file.       Health Maintenance:     ROS:  Gen: no fevers/chills, no changes in weight  Eyes: no changes in vision  ENT: no sore throat, no hearing loss, no bloody nose  Pulm: no sob, no cough  CV: no chest pain, no palpitations  GI: no nausea/vomiting, no diarrhea  : no dysuria  MSk: no myalgias  Skin: no rash  Neuro: no headaches, no numbness/tingling  Heme/Lymph: no easy bruising      Objective:     Exam:  /78 (BP Location: Left arm, Patient Position: Sitting, BP Cuff Size: Adult)   Pulse 82   Temp 36.7 °C (98 °F) (Temporal)   Resp 17   Ht 1.575 m (5' 2\")   Wt 44.9 kg (99 lb)   SpO2 95%   BMI 18.11 kg/m²  Body mass index is 18.11 kg/m².    Gen: Alert and oriented, No apparent distress.  Neck: Neck is supple without lymphadenopathy.  Lungs: Normal effort, CTA bilaterally, no wheezes, rhonchi, or " rales  CV: Regular rate and rhythm. No murmurs, rubs, or gallops.  Ext: No clubbing, cyanosis, edema.      Labs: No new labs    Assessment & Plan:     67 y.o. female with the following -     Problem List Items Addressed This Visit       HTN (hypertension)     Refilled meds         Relevant Medications    lisinopril-hydrochlorothiazide (PRINZIDE) 10-12.5 MG per tablet    Low back pain     Refilled meds         Relevant Medications    HYDROcodone-acetaminophen (NORCO) 7.5-325 MG tab (Start on 2/3/2023)    HYDROcodone-acetaminophen (NORCO) 7.5-325 MG tab (Start on 3/5/2023)    HYDROcodone-acetaminophen (NORCO) 7.5-325 MG tab (Start on 4/4/2023)    Allergic blepharitis     Refill accular          Other Visit Diagnoses       Dry eyes        Relevant Orders    Referral to Ophthalmology                Return in about 3 months (around 4/18/2023) for HTN, pain meds.    Please note that this dictation was created using voice recognition software. I have made every reasonable attempt to correct obvious errors, but I expect that there are errors of grammar and possibly content that I did not discover before finalizing the note.

## 2023-03-29 ENCOUNTER — OFFICE VISIT (OUTPATIENT)
Dept: MEDICAL GROUP | Facility: CLINIC | Age: 68
End: 2023-03-29
Payer: MEDICARE

## 2023-03-29 VITALS
RESPIRATION RATE: 20 BRPM | OXYGEN SATURATION: 95 % | SYSTOLIC BLOOD PRESSURE: 114 MMHG | TEMPERATURE: 98.1 F | HEART RATE: 95 BPM | WEIGHT: 106.13 LBS | DIASTOLIC BLOOD PRESSURE: 62 MMHG | HEIGHT: 62 IN | BODY MASS INDEX: 19.53 KG/M2

## 2023-03-29 DIAGNOSIS — G89.29 CHRONIC BILATERAL LOW BACK PAIN WITHOUT SCIATICA: ICD-10-CM

## 2023-03-29 DIAGNOSIS — L30.9 ECZEMA, UNSPECIFIED TYPE: ICD-10-CM

## 2023-03-29 DIAGNOSIS — I10 PRIMARY HYPERTENSION: ICD-10-CM

## 2023-03-29 DIAGNOSIS — M54.50 CHRONIC BILATERAL LOW BACK PAIN WITHOUT SCIATICA: ICD-10-CM

## 2023-03-29 DIAGNOSIS — M72.2 PLANTAR FASCIITIS: ICD-10-CM

## 2023-03-29 DIAGNOSIS — J06.9 VIRAL UPPER RESPIRATORY TRACT INFECTION: ICD-10-CM

## 2023-03-29 LAB
FLUAV RNA SPEC QL NAA+PROBE: NEGATIVE
FLUBV RNA SPEC QL NAA+PROBE: NEGATIVE
RSV RNA SPEC QL NAA+PROBE: NEGATIVE
SARS-COV-2 RNA RESP QL NAA+PROBE: NEGATIVE

## 2023-03-29 PROCEDURE — 99214 OFFICE O/P EST MOD 30 MIN: CPT | Performed by: FAMILY MEDICINE

## 2023-03-29 PROCEDURE — 0241U POCT CEPHEID COV-2, FLU A/B, RSV - PCR: CPT | Performed by: FAMILY MEDICINE

## 2023-03-29 PROCEDURE — 87651 STREP A DNA AMP PROBE: CPT | Performed by: FAMILY MEDICINE

## 2023-03-29 RX ORDER — HYDROCODONE BITARTRATE AND ACETAMINOPHEN 7.5; 325 MG/1; MG/1
1-2 TABLET ORAL EVERY 4 HOURS PRN
Qty: 120 TABLET | Refills: 0 | Status: SHIPPED | OUTPATIENT
Start: 2023-05-04 | End: 2023-06-03

## 2023-03-29 RX ORDER — HYDROCODONE BITARTRATE AND ACETAMINOPHEN 7.5; 325 MG/1; MG/1
1-2 TABLET ORAL EVERY 4 HOURS PRN
Qty: 120 TABLET | Refills: 0 | Status: SHIPPED | OUTPATIENT
Start: 2023-04-04 | End: 2023-05-04

## 2023-03-29 RX ORDER — BENZOCAINE/MENTHOL 6 MG-10 MG
1 LOZENGE MUCOUS MEMBRANE 2 TIMES DAILY
Qty: 30 G | Refills: 3 | Status: SHIPPED | OUTPATIENT
Start: 2023-03-29

## 2023-03-29 RX ORDER — GABAPENTIN 300 MG/1
300 CAPSULE ORAL 2 TIMES DAILY
Qty: 180 CAPSULE | Refills: 3 | Status: SHIPPED | OUTPATIENT
Start: 2023-03-29 | End: 2023-08-04 | Stop reason: SDUPTHER

## 2023-03-29 RX ORDER — MOXIFLOXACIN 5 MG/ML
SOLUTION/ DROPS OPHTHALMIC
COMMUNITY
Start: 2023-02-01 | End: 2023-08-29

## 2023-03-29 RX ORDER — HYDROCODONE BITARTRATE AND ACETAMINOPHEN 7.5; 325 MG/1; MG/1
1-2 TABLET ORAL EVERY 4 HOURS PRN
Qty: 120 TABLET | Refills: 0 | Status: SHIPPED | OUTPATIENT
Start: 2023-06-03 | End: 2023-07-03

## 2023-03-29 RX ORDER — BENZONATATE 100 MG/1
100 CAPSULE ORAL 3 TIMES DAILY PRN
Qty: 60 CAPSULE | Refills: 0 | Status: SHIPPED | OUTPATIENT
Start: 2023-03-29

## 2023-03-29 NOTE — PROGRESS NOTES
Subjective:     CC: Medication refills and URI.     HPI:   Jemima presents today with the 2 above-mentioned problems.  He developed a URI symptoms approximately 2 weeks ago.  He has sore throat.  Congestion.  And cough nocturnal.  She is tried over-the-counter medications still feels that its not working well.  Her big complaint is fatigue.    's is time for her controlled substances refilled.  She states that she is due April 1.  No red flags.    She has recurrent facial rash that responded in the past his steroid cream.  She is requesting refill.    In her fasciitis has continued to bother her quite a bit.    Problem   Viral Upper Respiratory Tract Infection    Sore throat and cough for about a week.  Eye pain.  Chills, no fever.  Very lethargic.   COVID negative.     Htn (Hypertension)    BP doing well     Eczema    Intermittent facial rash     Plantar Fasciitis    Just got PRP treatment of both feet.  Helped a lot         Current Outpatient Medications Ordered in Epic   Medication Sig Dispense Refill    [START ON 4/4/2023] HYDROcodone-acetaminophen (NORCO) 7.5-325 MG tab Take 1-2 Tablets by mouth every four hours as needed for Severe Pain for up to 30 days. Indications: Pain, Chronic back pain 120 Tablet 0    [START ON 5/4/2023] HYDROcodone-acetaminophen (NORCO) 7.5-325 MG tab Take 1-2 Tablets by mouth every four hours as needed for Severe Pain for up to 30 days. Indications: Pain, Chronic back pain 120 Tablet 0    [START ON 6/3/2023] HYDROcodone-acetaminophen (NORCO) 7.5-325 MG tab Take 1-2 Tablets by mouth every four hours as needed for Severe Pain for up to 30 days. Indications: Pain, Chronic back pain 120 Tablet 0    gabapentin (NEURONTIN) 300 MG Cap Take 1 Capsule by mouth 2 times a day. Indications: foot pain 180 Capsule 3    hydrocortisone 1 % Cream Apply 1 Application. topically 2 times a day. 30 g 3    benzonatate (TESSALON) 100 MG Cap Take 1 Capsule by mouth 3 times a day as needed for Cough. 60 Capsule  0    ketorolac (ACULAR) 0.5 % Solution Administer 1 Drop into both eyes 4 times a day. 5 mL 3    lisinopril-hydrochlorothiazide (PRINZIDE) 10-12.5 MG per tablet TAKE 1 TABLET BY MOUTH EVERY DAY 90 Tablet 3    meloxicam (MOBIC) 15 MG tablet Take 1 Tablet by mouth every day. 90 Tablet 3    oxybutynin (DITROPAN) 5 MG Tab Take 1 Tablet by mouth every evening. 90 Tablet 3    cyclobenzaprine (FLEXERIL) 10 mg Tab TAKE 1 TABLET BY MOUTH THREE TIMES DAILY AS NEEDED FOR MUSCLE SPASMS 270 Tablet 3    omeprazole (PRILOSEC) 20 MG delayed-release capsule TAKE 1 CAPSULE BY MOUTH EVERY DAY 90 Capsule 3    Naloxone (NARCAN) 4 MG/0.1ML Liquid One spray in one nostril for overdose and call 911. 1 Each 1    fluticasone (FLONASE) 50 MCG/ACT nasal spray Administer 2 Sprays into affected nostril(S) every day. 16 g 3    moxifloxacin (VIGAMOX) 0.5 % Solution INSTILL 1 DROP IN LEFT EYE THREE TIMES DAILY (Patient not taking: Reported on 3/29/2023)      HYDROcodone-acetaminophen (NORCO) 7.5-325 MG tab Take 1-2 Tablets by mouth every four hours as needed for Severe Pain for up to 30 days. Indications: Pain, Chronic back pain 120 Tablet 0    lisinopril-hydrochlorothiazide (PRINZIDE) 10-12.5 MG per tablet Take 1 Tablet by mouth every day. 90 Tablet 3    triamcinolone acetonide (KENALOG) 0.1 % Cream  (Patient not taking: Reported on 3/29/2023)       No current [facility]-ordered facility-administered medications on file.       Health Maintenance:     ROS:  Gen: no fevers/chills, no changes in weight  Eyes: no changes in vision  ENT: no sore throat, no hearing loss, no bloody nose  Pulm: no sob, no cough  CV: no chest pain, no palpitations  GI: no nausea/vomiting, no diarrhea  : no dysuria  MSk: no myalgias  Skin: no rash  Neuro: no headaches, no numbness/tingling  Heme/Lymph: no easy bruising      Objective:     Exam:  /62 (BP Location: Left arm, Patient Position: Sitting, BP Cuff Size: Adult)   Pulse 95   Temp 36.7 °C (98.1 °F) (Temporal)    "Resp 20   Ht 1.575 m (5' 2\")   Wt 48.1 kg (106 lb 2 oz)   SpO2 95%   Breastfeeding No   BMI 19.41 kg/m²  Body mass index is 19.41 kg/m².    Gen: Alert and oriented, No apparent distress.  Neck: Neck is supple without lymphadenopathy.  Lungs: Normal effort, CTA bilaterally, no wheezes, rhonchi, or rales  CV: Regular rate and rhythm. No murmurs, rubs, or gallops.  Ext: No clubbing, cyanosis, edema.      Labs: None    Assessment & Plan:     68 y.o. female with the following -     Problem List Items Addressed This Visit       Plantar fasciitis     Still a fair amount of pain  Pain meds refilled.  Continue with podiatry         Eczema     Hydrocortisone cr         HTN (hypertension)     Continue current meds.         Low back pain    Relevant Medications    HYDROcodone-acetaminophen (NORCO) 7.5-325 MG tab (Start on 4/4/2023)    HYDROcodone-acetaminophen (NORCO) 7.5-325 MG tab (Start on 5/4/2023)    HYDROcodone-acetaminophen (NORCO) 7.5-325 MG tab (Start on 6/3/2023)    Viral upper respiratory tract infection     Will recheck COVID         Relevant Orders    POCT CoV-2, Flu A/B, RSV by PCR           No follow-ups on file.    Please note that this dictation was created using voice recognition software. I have made every reasonable attempt to correct obvious errors, but I expect that there are errors of grammar and possibly content that I did not discover before finalizing the note.        "

## 2023-04-26 RX ORDER — OXYBUTYNIN CHLORIDE 5 MG/1
5 TABLET ORAL NIGHTLY
Qty: 90 TABLET | Refills: 3 | Status: SHIPPED | OUTPATIENT
Start: 2023-04-26 | End: 2024-01-31

## 2023-05-17 ENCOUNTER — OFFICE VISIT (OUTPATIENT)
Dept: MEDICAL GROUP | Facility: CLINIC | Age: 68
End: 2023-05-17
Payer: MEDICARE

## 2023-05-17 VITALS
BODY MASS INDEX: 18.58 KG/M2 | WEIGHT: 101 LBS | HEIGHT: 62 IN | OXYGEN SATURATION: 96 % | SYSTOLIC BLOOD PRESSURE: 131 MMHG | DIASTOLIC BLOOD PRESSURE: 77 MMHG | HEART RATE: 71 BPM | RESPIRATION RATE: 17 BRPM

## 2023-05-17 DIAGNOSIS — G89.4 CHRONIC PAIN SYNDROME: ICD-10-CM

## 2023-05-17 DIAGNOSIS — Z00.00 HEALTHCARE MAINTENANCE: ICD-10-CM

## 2023-05-17 DIAGNOSIS — I10 PRIMARY HYPERTENSION: ICD-10-CM

## 2023-05-17 DIAGNOSIS — H01.119 CONTACT DERMATITIS OF EYELID, UNSPECIFIED LATERALITY: ICD-10-CM

## 2023-05-17 PROCEDURE — 99214 OFFICE O/P EST MOD 30 MIN: CPT | Mod: 25 | Performed by: FAMILY MEDICINE

## 2023-05-17 PROCEDURE — 3075F SYST BP GE 130 - 139MM HG: CPT | Performed by: FAMILY MEDICINE

## 2023-05-17 PROCEDURE — 0134A MODERNA SARS-COV-2 VACCINE BIVALENT BOOSTER (12+): CPT | Performed by: FAMILY MEDICINE

## 2023-05-17 PROCEDURE — 3078F DIAST BP <80 MM HG: CPT | Performed by: FAMILY MEDICINE

## 2023-05-17 PROCEDURE — 91313 MODERNA SARS-COV-2 VACCINE BIVALENT BOOSTER (12+): CPT | Performed by: FAMILY MEDICINE

## 2023-05-17 RX ORDER — LISINOPRIL AND HYDROCHLOROTHIAZIDE 12.5; 1 MG/1; MG/1
1 TABLET ORAL DAILY
Qty: 90 TABLET | Refills: 3 | Status: SHIPPED | OUTPATIENT
Start: 2023-05-17 | End: 2023-08-29

## 2023-05-17 RX ORDER — FLUTICASONE PROPIONATE 50 MCG
2 SPRAY, SUSPENSION (ML) NASAL DAILY
Qty: 16 G | Refills: 3 | Status: SHIPPED | OUTPATIENT
Start: 2023-05-17

## 2023-05-17 RX ORDER — KETOROLAC TROMETHAMINE 5 MG/ML
1 SOLUTION OPHTHALMIC 4 TIMES DAILY
Qty: 5 ML | Refills: 3 | Status: SHIPPED | OUTPATIENT
Start: 2023-05-17

## 2023-05-17 NOTE — PROGRESS NOTES
Subjective:     CC: Med refill    HPI:   Jemima presents today with a need for medication refills she needs a refill on her eye medicine, pressure medicine, medicine.    The medicine is doing well for pain management.  He does not need any escalation of her medicine.  I looked at her chart and she actually has refills through the end of July.  No noted medications are needed today    Blood pressure is well controlled.  Is not having any chest pain or valve palpitations.    She also has complained of a rash.  He is hydrocortisone cream for that and is asking for refill.    Had a longstanding history of allergic blepharitis.  Needs a refill of her medication for that as well.    Problem   Healthcare Maintenance    Wants shingrix       Allergic Blepharitis    Bilateral blepharitis.  Responded to acular       Chronic Pain Syndrome    About the same.  Needs med refill       Htn (Hypertension)    BP doing well           Current Outpatient Medications Ordered in Epic   Medication Sig Dispense Refill    lisinopril-hydrochlorothiazide (PRINZIDE) 10-12.5 MG per tablet Take 1 Tablet by mouth every day. 90 Tablet 3    ketorolac (ACULAR) 0.5 % Solution Administer 1 Drop into both eyes 4 times a day. 5 mL 3    fluticasone (FLONASE) 50 MCG/ACT nasal spray Administer 2 Sprays into affected nostril(S) every day. 16 g 3    oxybutynin (DITROPAN) 5 MG Tab TAKE 1 TABLET BY MOUTH EVERY EVENING 90 Tablet 3    HYDROcodone-acetaminophen (NORCO) 7.5-325 MG tab Take 1-2 Tablets by mouth every four hours as needed for Severe Pain for up to 30 days. Indications: Pain, Chronic back pain 120 Tablet 0    [START ON 6/3/2023] HYDROcodone-acetaminophen (NORCO) 7.5-325 MG tab Take 1-2 Tablets by mouth every four hours as needed for Severe Pain for up to 30 days. Indications: Pain, Chronic back pain 120 Tablet 0    gabapentin (NEURONTIN) 300 MG Cap Take 1 Capsule by mouth 2 times a day. Indications: foot pain 180 Capsule 3    hydrocortisone 1 % Cream  "Apply 1 Application. topically 2 times a day. 30 g 3    lisinopril-hydrochlorothiazide (PRINZIDE) 10-12.5 MG per tablet Take 1 Tablet by mouth every day. 90 Tablet 3    meloxicam (MOBIC) 15 MG tablet Take 1 Tablet by mouth every day. 90 Tablet 3    cyclobenzaprine (FLEXERIL) 10 mg Tab TAKE 1 TABLET BY MOUTH THREE TIMES DAILY AS NEEDED FOR MUSCLE SPASMS 270 Tablet 3    omeprazole (PRILOSEC) 20 MG delayed-release capsule TAKE 1 CAPSULE BY MOUTH EVERY DAY 90 Capsule 3    Naloxone (NARCAN) 4 MG/0.1ML Liquid One spray in one nostril for overdose and call 911. 1 Each 1    moxifloxacin (VIGAMOX) 0.5 % Solution INSTILL 1 DROP IN LEFT EYE THREE TIMES DAILY (Patient not taking: Reported on 3/29/2023)      benzonatate (TESSALON) 100 MG Cap Take 1 Capsule by mouth 3 times a day as needed for Cough. (Patient not taking: Reported on 5/17/2023) 60 Capsule 0    triamcinolone acetonide (KENALOG) 0.1 % Cream  (Patient not taking: Reported on 3/29/2023)       No current [facility]-ordered facility-administered medications on file.       Health Maintenance:     ROS:  Gen: no fevers/chills, no changes in weight  Eyes: no changes in vision  ENT: no sore throat, no hearing loss, no bloody nose  Pulm: no sob, no cough  CV: no chest pain, no palpitations  GI: no nausea/vomiting, no diarrhea  : no dysuria  MSk: no myalgias  Skin: no rash  Neuro: no headaches, no numbness/tingling  Heme/Lymph: no easy bruising      Objective:     Exam:  /77 (BP Location: Right arm, Patient Position: Sitting, BP Cuff Size: Adult)   Pulse 71   Resp 17   Ht 1.575 m (5' 2\")   Wt 45.8 kg (101 lb)   SpO2 96%   BMI 18.47 kg/m²  Body mass index is 18.47 kg/m².    Gen: Alert and oriented, No apparent distress.  Neck: Neck is supple without lymphadenopathy.  Lungs: Normal effort, CTA bilaterally, no wheezes, rhonchi, or rales  CV: Regular rate and rhythm. No murmurs, rubs, or gallops.  Ext: No clubbing, cyanosis, edema.      Labs: None    Assessment & Plan: "     68 y.o. female with the following -     Problem List Items Addressed This Visit       Chronic pain syndrome     Records show enough pain meds through July.           HTN (hypertension)     No change in meds           Relevant Medications    lisinopril-hydrochlorothiazide (PRINZIDE) 10-12.5 MG per tablet    Allergic blepharitis     Refill accular           Healthcare maintenance     Shingrix vaccine ordered                  Return in about 3 months (around 8/17/2023) for controlled substances.    Please note that this dictation was created using voice recognition software. I have made every reasonable attempt to correct obvious errors, but I expect that there are errors of grammar and possibly content that I did not discover before finalizing the note.

## 2023-07-05 RX ORDER — CYCLOBENZAPRINE HCL 10 MG
TABLET ORAL
Qty: 270 TABLET | Refills: 3 | Status: SHIPPED | OUTPATIENT
Start: 2023-07-05

## 2023-08-04 DIAGNOSIS — G89.29 CHRONIC BILATERAL LOW BACK PAIN WITHOUT SCIATICA: ICD-10-CM

## 2023-08-04 DIAGNOSIS — G89.4 CHRONIC PAIN SYNDROME: ICD-10-CM

## 2023-08-04 DIAGNOSIS — M54.50 CHRONIC BILATERAL LOW BACK PAIN WITHOUT SCIATICA: ICD-10-CM

## 2023-08-04 RX ORDER — HYDROCODONE BITARTRATE AND ACETAMINOPHEN 7.5; 325 MG/1; MG/1
TABLET ORAL
COMMUNITY
Start: 2023-07-05 | End: 2023-08-04 | Stop reason: SDUPTHER

## 2023-08-04 RX ORDER — HYDROCODONE BITARTRATE AND ACETAMINOPHEN 7.5; 325 MG/1; MG/1
TABLET ORAL
Qty: 20 TABLET | Refills: 0 | Status: SHIPPED | OUTPATIENT
Start: 2023-08-04 | End: 2023-08-07 | Stop reason: SDUPTHER

## 2023-08-04 RX ORDER — GABAPENTIN 300 MG/1
300 CAPSULE ORAL 2 TIMES DAILY
Qty: 180 CAPSULE | Refills: 3 | Status: SHIPPED | OUTPATIENT
Start: 2023-08-04 | End: 2023-11-01 | Stop reason: SDUPTHER

## 2023-08-07 ENCOUNTER — TELEPHONE (OUTPATIENT)
Dept: MEDICAL GROUP | Facility: CLINIC | Age: 68
End: 2023-08-07
Payer: MEDICARE

## 2023-08-07 DIAGNOSIS — G89.29 CHRONIC BILATERAL LOW BACK PAIN WITHOUT SCIATICA: ICD-10-CM

## 2023-08-07 DIAGNOSIS — G89.4 CHRONIC PAIN SYNDROME: ICD-10-CM

## 2023-08-07 DIAGNOSIS — M54.50 CHRONIC BILATERAL LOW BACK PAIN WITHOUT SCIATICA: ICD-10-CM

## 2023-08-07 RX ORDER — HYDROCODONE BITARTRATE AND ACETAMINOPHEN 7.5; 325 MG/1; MG/1
TABLET ORAL
Qty: 120 TABLET | Refills: 0 | Status: SHIPPED | OUTPATIENT
Start: 2023-08-07 | End: 2023-08-10 | Stop reason: SDUPTHER

## 2023-08-07 NOTE — TELEPHONE ENCOUNTER
Patients  called to let us know that this patients medication seems to be wrong at the pharmacy.. There is no ICD10 or day supply and patients husbands states that she gets 120 pills not 20...

## 2023-08-10 ENCOUNTER — TELEPHONE (OUTPATIENT)
Dept: MEDICAL GROUP | Facility: CLINIC | Age: 68
End: 2023-08-10
Payer: MEDICARE

## 2023-08-10 DIAGNOSIS — G89.29 CHRONIC BILATERAL LOW BACK PAIN WITHOUT SCIATICA: ICD-10-CM

## 2023-08-10 DIAGNOSIS — M54.50 CHRONIC BILATERAL LOW BACK PAIN WITHOUT SCIATICA: ICD-10-CM

## 2023-08-10 DIAGNOSIS — G89.4 CHRONIC PAIN SYNDROME: ICD-10-CM

## 2023-08-10 RX ORDER — HYDROCODONE BITARTRATE AND ACETAMINOPHEN 7.5; 325 MG/1; MG/1
1 TABLET ORAL EVERY 6 HOURS PRN
Qty: 120 TABLET | Refills: 0 | Status: SHIPPED | OUTPATIENT
Start: 2023-08-10 | End: 2023-09-09

## 2023-08-10 NOTE — TELEPHONE ENCOUNTER
Pharmacy is needing the Amount of tablets and for how long EX: 30days for patients Norco.    Thank you so much!

## 2023-08-29 ENCOUNTER — OFFICE VISIT (OUTPATIENT)
Dept: MEDICAL GROUP | Facility: CLINIC | Age: 68
End: 2023-08-29
Payer: MEDICARE

## 2023-08-29 VITALS
RESPIRATION RATE: 16 BRPM | SYSTOLIC BLOOD PRESSURE: 104 MMHG | HEART RATE: 78 BPM | BODY MASS INDEX: 17.83 KG/M2 | TEMPERATURE: 98.3 F | WEIGHT: 96.9 LBS | HEIGHT: 62 IN | DIASTOLIC BLOOD PRESSURE: 66 MMHG | OXYGEN SATURATION: 96 %

## 2023-08-29 DIAGNOSIS — M54.50 CHRONIC BILATERAL LOW BACK PAIN WITHOUT SCIATICA: ICD-10-CM

## 2023-08-29 DIAGNOSIS — I10 PRIMARY HYPERTENSION: ICD-10-CM

## 2023-08-29 DIAGNOSIS — G89.29 CHRONIC BILATERAL LOW BACK PAIN WITHOUT SCIATICA: ICD-10-CM

## 2023-08-29 DIAGNOSIS — N32.81 OVERACTIVE BLADDER: ICD-10-CM

## 2023-08-29 PROCEDURE — 3074F SYST BP LT 130 MM HG: CPT | Performed by: FAMILY MEDICINE

## 2023-08-29 PROCEDURE — 3078F DIAST BP <80 MM HG: CPT | Performed by: FAMILY MEDICINE

## 2023-08-29 PROCEDURE — 99214 OFFICE O/P EST MOD 30 MIN: CPT | Performed by: FAMILY MEDICINE

## 2023-08-29 RX ORDER — TRAMADOL HYDROCHLORIDE 50 MG/1
50 TABLET ORAL EVERY 4 HOURS PRN
Qty: 120 TABLET | Refills: 0 | Status: SHIPPED | OUTPATIENT
Start: 2023-08-29 | End: 2023-09-19

## 2023-08-29 RX ORDER — MELOXICAM 15 MG/1
15 TABLET ORAL DAILY
Qty: 90 TABLET | Refills: 3 | Status: SHIPPED | OUTPATIENT
Start: 2023-08-29 | End: 2023-11-01 | Stop reason: SDUPTHER

## 2023-08-29 NOTE — PROGRESS NOTES
Subjective:     CC: Back pain, hypertension, overactive bladder    HPI:   Jemima presents today with for her hypertension and overactive bladder she basically just needs refills on her medication the medicine seem to be working well her blood pressure is well controlled she is got no chest pain or palpitation.    As far as her chronic pain syndrome because of the supply chain issues she has been unable to get her hydrocodone as ordered.  She is requesting change to tramadol.    Problem   Overactive Bladder    On oxybutinin.  Working well needs refill     Htn (Hypertension)    BP doing well     Low Back Pain    Patient with longstanding history of low back pain.  Is normally seen by Dr. Foreman for this, and has been taking Norco 7.5-325 mg tablets for multiple years.  She states she has never had any adverse reaction secondary to this medication, including confusion, falls, respiratory depression.    Back pain somewhat worse over the holidays    Needs norco refill         Current Outpatient Medications Ordered in Epic   Medication Sig Dispense Refill    meloxicam (MOBIC) 15 MG tablet Take 1 Tablet by mouth every day. 90 Tablet 3    traMADol (ULTRAM) 50 MG Tab Take 1 Tablet by mouth every four hours as needed for Mild Pain for up to 30 days. 120 Tablet 0    HYDROcodone-acetaminophen (NORCO) 7.5-325 MG tab Take 1 Tablet by mouth every 6 hours as needed for Moderate Pain for up to 30 days. TAKE 1 TO 2 TABLETS BY MOUTH EVERY 4 HOURS AS NEEDED FOR SEVERE PAIN OR CHRONIC PAIN OR BACK PAIN 120 Tablet 0    gabapentin (NEURONTIN) 300 MG Cap Take 1 Capsule by mouth 2 times a day. Indications: foot pain 180 Capsule 3    cyclobenzaprine (FLEXERIL) 10 mg Tab TAKE 1 TABLET BY MOUTH THREE TIMES DAILY AS NEEDED FOR MUSCLE SPASMS 270 Tablet 3    ketorolac (ACULAR) 0.5 % Solution Administer 1 Drop into both eyes 4 times a day. 5 mL 3    fluticasone (FLONASE) 50 MCG/ACT nasal spray Administer 2 Sprays into affected nostril(S) every day.  "16 g 3    hydrocortisone 2.5 % Cream topical cream Apply 1 Application. topically 2 times a day. 30 g 3    oxybutynin (DITROPAN) 5 MG Tab TAKE 1 TABLET BY MOUTH EVERY EVENING 90 Tablet 3    hydrocortisone 1 % Cream Apply 1 Application. topically 2 times a day. 30 g 3    lisinopril-hydrochlorothiazide (PRINZIDE) 10-12.5 MG per tablet Take 1 Tablet by mouth every day. 90 Tablet 3    omeprazole (PRILOSEC) 20 MG delayed-release capsule TAKE 1 CAPSULE BY MOUTH EVERY DAY 90 Capsule 3    Naloxone (NARCAN) 4 MG/0.1ML Liquid One spray in one nostril for overdose and call 911. 1 Each 1    benzonatate (TESSALON) 100 MG Cap Take 1 Capsule by mouth 3 times a day as needed for Cough. (Patient not taking: Reported on 5/17/2023) 60 Capsule 0     No current Epic-ordered facility-administered medications on file.       Health Maintenance:     ROS:  Gen: no fevers/chills, no changes in weight  Eyes: no changes in vision  ENT: no sore throat, no hearing loss, no bloody nose  Pulm: no sob, no cough  CV: no chest pain, no palpitations  GI: no nausea/vomiting, no diarrhea  : no dysuria  MSk: no myalgias  Skin: no rash  Neuro: no headaches, no numbness/tingling  Heme/Lymph: no easy bruising      Objective:     Exam:  /66 (BP Location: Left arm, Patient Position: Sitting, BP Cuff Size: Adult)   Pulse 78   Temp 36.8 °C (98.3 °F) (Temporal)   Resp 16   Ht 1.575 m (5' 2\")   Wt 44 kg (96 lb 14.4 oz)   SpO2 96%   BMI 17.72 kg/m²  Body mass index is 17.72 kg/m².    Gen: Alert and oriented, No apparent distress.  Neck: Neck is supple without lymphadenopathy.  Lungs: Normal effort, CTA bilaterally, no wheezes, rhonchi, or rales  CV: Regular rate and rhythm. No murmurs, rubs, or gallops.  Ext: No clubbing, cyanosis, edema.      Labs: No new    Assessment & Plan:     68 y.o. female with the following -     Problem List Items Addressed This Visit       HTN (hypertension)     Continue lisinopril HCTZ         Low back pain     Refilled " meloxicam.   Wants off norco and wants to try tramadol         Relevant Medications    meloxicam (MOBIC) 15 MG tablet    traMADol (ULTRAM) 50 MG Tab    Overactive bladder     Refill oxybutinin.                 Return in about 3 months (around 11/29/2023) for CS refill.    Please note that this dictation was created using voice recognition software. I have made every reasonable attempt to correct obvious errors, but I expect that there are errors of grammar and possibly content that I did not discover before finalizing the note.

## 2023-09-19 ENCOUNTER — OFFICE VISIT (OUTPATIENT)
Dept: MEDICAL GROUP | Facility: CLINIC | Age: 68
End: 2023-09-19
Payer: MEDICARE

## 2023-09-19 VITALS
RESPIRATION RATE: 16 BRPM | SYSTOLIC BLOOD PRESSURE: 124 MMHG | WEIGHT: 96 LBS | BODY MASS INDEX: 17.66 KG/M2 | HEIGHT: 62 IN | HEART RATE: 96 BPM | DIASTOLIC BLOOD PRESSURE: 78 MMHG | OXYGEN SATURATION: 96 %

## 2023-09-19 DIAGNOSIS — I10 PRIMARY HYPERTENSION: ICD-10-CM

## 2023-09-19 DIAGNOSIS — M54.50 CHRONIC BILATERAL LOW BACK PAIN WITHOUT SCIATICA: ICD-10-CM

## 2023-09-19 DIAGNOSIS — M72.2 PLANTAR FASCIITIS: ICD-10-CM

## 2023-09-19 DIAGNOSIS — R41.3 MEMORY LOSS: ICD-10-CM

## 2023-09-19 DIAGNOSIS — G89.29 CHRONIC BILATERAL LOW BACK PAIN WITHOUT SCIATICA: ICD-10-CM

## 2023-09-19 PROCEDURE — 99214 OFFICE O/P EST MOD 30 MIN: CPT | Performed by: FAMILY MEDICINE

## 2023-09-19 PROCEDURE — 3078F DIAST BP <80 MM HG: CPT | Performed by: FAMILY MEDICINE

## 2023-09-19 PROCEDURE — 3074F SYST BP LT 130 MM HG: CPT | Performed by: FAMILY MEDICINE

## 2023-09-19 RX ORDER — HYDROCODONE BITARTRATE AND ACETAMINOPHEN 10; 325 MG/1; MG/1
1-2 TABLET ORAL EVERY 6 HOURS PRN
Qty: 60 TABLET | Refills: 0 | Status: SHIPPED | OUTPATIENT
Start: 2023-09-19 | End: 2023-09-21 | Stop reason: DRUGHIGH

## 2023-09-19 NOTE — ASSESSMENT & PLAN NOTE
MMSE with multiple misses  Mini cog 0 for 2.  Will get labs:  CBC, CMP, TSH, vit B12  Neuro consult
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
## 2023-09-21 DIAGNOSIS — G89.29 CHRONIC BILATERAL LOW BACK PAIN WITHOUT SCIATICA: ICD-10-CM

## 2023-09-21 DIAGNOSIS — M54.50 CHRONIC BILATERAL LOW BACK PAIN WITHOUT SCIATICA: ICD-10-CM

## 2023-09-21 DIAGNOSIS — G89.4 CHRONIC PAIN SYNDROME: ICD-10-CM

## 2023-09-21 RX ORDER — HYDROCODONE BITARTRATE AND ACETAMINOPHEN 7.5; 325 MG/1; MG/1
1 TABLET ORAL EVERY 6 HOURS PRN
Qty: 60 TABLET | Refills: 0 | Status: SHIPPED | OUTPATIENT
Start: 2023-09-21 | End: 2023-10-21

## 2023-10-18 DIAGNOSIS — G89.4 CHRONIC PAIN SYNDROME: ICD-10-CM

## 2023-10-18 RX ORDER — TRAMADOL HYDROCHLORIDE 50 MG/1
50 TABLET ORAL EVERY 4 HOURS PRN
Qty: 120 TABLET | Refills: 0 | Status: SHIPPED | OUTPATIENT
Start: 2023-10-18 | End: 2023-11-06

## 2023-10-23 ENCOUNTER — NON-PROVIDER VISIT (OUTPATIENT)
Dept: MEDICAL GROUP | Facility: CLINIC | Age: 68
End: 2023-10-23
Payer: MEDICARE

## 2023-10-23 DIAGNOSIS — Z23 NEED FOR VACCINATION: ICD-10-CM

## 2023-10-23 PROCEDURE — G0008 ADMIN INFLUENZA VIRUS VAC: HCPCS | Performed by: FAMILY MEDICINE

## 2023-10-23 PROCEDURE — 90686 IIV4 VACC NO PRSV 0.5 ML IM: CPT | Performed by: FAMILY MEDICINE

## 2023-10-23 PROCEDURE — 99999 PR NO CHARGE: CPT | Performed by: FAMILY MEDICINE

## 2023-10-24 NOTE — PROGRESS NOTES
"Jemima Delong is a 68 y.o. female here for a non-provider visit for:   FLU    Reason for immunization: Annual Flu Vaccine  Immunization records indicate need for vaccine: Yes, confirmed with DESI Randolph  Minimum interval has been met for this vaccine: Yes  ABN completed: Yes    VIS Dated  10/23/23 was given to patient: Yes  All IAC Questionnaire questions were answered \"No.\"    Patient tolerated injection and no adverse effects were observed or reported: Yes    Pt scheduled for next dose in series: No   "

## 2023-11-01 DIAGNOSIS — G89.4 CHRONIC PAIN SYNDROME: ICD-10-CM

## 2023-11-01 RX ORDER — MELOXICAM 15 MG/1
15 TABLET ORAL DAILY
Qty: 90 TABLET | Refills: 3 | Status: SHIPPED | OUTPATIENT
Start: 2023-11-01 | End: 2024-02-20 | Stop reason: SDUPTHER

## 2023-11-01 RX ORDER — GABAPENTIN 300 MG/1
300 CAPSULE ORAL 2 TIMES DAILY
Qty: 180 CAPSULE | Refills: 3 | Status: SHIPPED | OUTPATIENT
Start: 2023-11-01 | End: 2024-02-20 | Stop reason: SDUPTHER

## 2023-11-01 RX ORDER — TRAMADOL HYDROCHLORIDE 50 MG/1
50 TABLET ORAL EVERY 4 HOURS PRN
Qty: 120 TABLET | Refills: 0 | Status: CANCELLED | OUTPATIENT
Start: 2023-11-01 | End: 2023-12-01

## 2023-11-06 DIAGNOSIS — M54.50 CHRONIC BILATERAL LOW BACK PAIN WITHOUT SCIATICA: ICD-10-CM

## 2023-11-06 DIAGNOSIS — G89.29 CHRONIC BILATERAL LOW BACK PAIN WITHOUT SCIATICA: ICD-10-CM

## 2023-11-06 RX ORDER — HYDROCODONE BITARTRATE AND ACETAMINOPHEN 5; 325 MG/1; MG/1
1 TABLET ORAL EVERY 4 HOURS PRN
Qty: 120 TABLET | Refills: 0 | Status: SHIPPED | OUTPATIENT
Start: 2023-11-06 | End: 2023-12-01 | Stop reason: SDUPTHER

## 2023-12-01 ENCOUNTER — OFFICE VISIT (OUTPATIENT)
Dept: MEDICAL GROUP | Facility: CLINIC | Age: 68
End: 2023-12-01
Payer: MEDICARE

## 2023-12-01 VITALS
RESPIRATION RATE: 17 BRPM | SYSTOLIC BLOOD PRESSURE: 160 MMHG | HEART RATE: 100 BPM | WEIGHT: 96 LBS | OXYGEN SATURATION: 95 % | DIASTOLIC BLOOD PRESSURE: 84 MMHG | BODY MASS INDEX: 17.56 KG/M2

## 2023-12-01 DIAGNOSIS — I10 PRIMARY HYPERTENSION: ICD-10-CM

## 2023-12-01 DIAGNOSIS — M54.50 CHRONIC BILATERAL LOW BACK PAIN WITHOUT SCIATICA: ICD-10-CM

## 2023-12-01 DIAGNOSIS — M72.2 PLANTAR FASCIITIS: ICD-10-CM

## 2023-12-01 DIAGNOSIS — G89.29 CHRONIC BILATERAL LOW BACK PAIN WITHOUT SCIATICA: ICD-10-CM

## 2023-12-01 PROCEDURE — 3079F DIAST BP 80-89 MM HG: CPT | Performed by: FAMILY MEDICINE

## 2023-12-01 PROCEDURE — 3077F SYST BP >= 140 MM HG: CPT | Performed by: FAMILY MEDICINE

## 2023-12-01 PROCEDURE — 99214 OFFICE O/P EST MOD 30 MIN: CPT | Performed by: FAMILY MEDICINE

## 2023-12-01 RX ORDER — TRAMADOL HYDROCHLORIDE 50 MG/1
50 TABLET ORAL EVERY 8 HOURS PRN
Qty: 90 TABLET | Refills: 0 | Status: SHIPPED | OUTPATIENT
Start: 2023-12-31 | End: 2024-01-03 | Stop reason: SDUPTHER

## 2023-12-01 RX ORDER — HYDROCODONE BITARTRATE AND ACETAMINOPHEN 5; 325 MG/1; MG/1
1 TABLET ORAL EVERY 4 HOURS PRN
Qty: 120 TABLET | Refills: 0 | Status: SHIPPED | OUTPATIENT
Start: 2023-12-06 | End: 2024-01-05

## 2023-12-01 RX ORDER — TRAMADOL HYDROCHLORIDE 50 MG/1
50 TABLET ORAL EVERY 8 HOURS PRN
Qty: 90 TABLET | Refills: 0 | Status: SHIPPED | OUTPATIENT
Start: 2024-01-30 | End: 2024-02-20 | Stop reason: SDUPTHER

## 2023-12-01 RX ORDER — HYDROCODONE BITARTRATE AND ACETAMINOPHEN 5; 325 MG/1; MG/1
1 TABLET ORAL EVERY 4 HOURS PRN
Qty: 120 TABLET | Refills: 0 | Status: SHIPPED | OUTPATIENT
Start: 2024-02-04 | End: 2024-02-20 | Stop reason: SDUPTHER

## 2023-12-01 RX ORDER — HYDROCODONE BITARTRATE AND ACETAMINOPHEN 5; 325 MG/1; MG/1
1 TABLET ORAL EVERY 4 HOURS PRN
Qty: 120 TABLET | Refills: 0 | Status: SHIPPED | OUTPATIENT
Start: 2024-01-05 | End: 2024-02-04

## 2023-12-01 RX ORDER — TRAMADOL HYDROCHLORIDE 50 MG/1
50 TABLET ORAL EVERY 8 HOURS PRN
Qty: 90 TABLET | Refills: 0 | Status: SHIPPED | OUTPATIENT
Start: 2023-12-01 | End: 2023-12-31

## 2023-12-01 NOTE — PROGRESS NOTES
Subjective:     CC: Discuss lab results.  Also medication refills.  And plantar fasciitis    HPI:   Jemima presents today with the above problems.  She just recently had her annual labs done.  They basically look pretty normal.    Continues with low back pain radiating down both legs to ankles.  She is seeing pain management who has scheduled a nerve ablation here in a couple weeks.  She also needs refill of her medications.  She was on hydrocodone 10 mg 4 times a day however unable to get that at the pharmacy.  So we have changed her to 5 mg 4 times a day and supplemented with tramadol.  That seems to give her adequate pain control.    He has a new podiatrist for her Planter fasciitis.  She did get a PRP injection which did not seem to help at all.  She is scheduled for cortisone injection sometime later this month.    Problem   Htn (Hypertension)    BP doing well.  Very high on intake today. Repeat /74     Low Back Pain    Patient with longstanding history of low back pain.  Is normally seen by Dr. Foreman for this, and has been taking Norco 7.5-325 mg tablets for multiple years.  She states she has never had any adverse reaction secondary to this medication, including confusion, falls, respiratory depression.    Back pain somewhat worse over the holidays    Needs norco refill  Tried to change to tramadol but got no pain relief.  Worried that medication may be interfering with memory.    In pain management.  Will get nerve ablation in a couple of weeks.     Plantar Fasciitis    Just got PRP treatment of both feet.  Helped a lot    New foot doctor.  PRP not helpful.  Gets mild relief with cortisone foot injections         Current Outpatient Medications Ordered in Epic   Medication Sig Dispense Refill    [START ON 12/6/2023] HYDROcodone-acetaminophen (NORCO) 5-325 MG Tab per tablet Take 1 Tablet by mouth every four hours as needed (severe pain) for up to 30 days. 120 Tablet 0    [START ON 1/5/2024]  HYDROcodone-acetaminophen (NORCO) 5-325 MG Tab per tablet Take 1 Tablet by mouth every four hours as needed (severe pain) for up to 30 days. 120 Tablet 0    [START ON 2/4/2024] HYDROcodone-acetaminophen (NORCO) 5-325 MG Tab per tablet Take 1 Tablet by mouth every four hours as needed (severe pain) for up to 30 days. 120 Tablet 0    traMADol (ULTRAM) 50 MG Tab Take 1 Tablet by mouth every 8 hours as needed for Mild Pain for up to 30 days. 90 Tablet 0    [START ON 12/31/2023] traMADol (ULTRAM) 50 MG Tab Take 1 Tablet by mouth every 8 hours as needed for Mild Pain for up to 30 days. 90 Tablet 0    [START ON 1/30/2024] traMADol (ULTRAM) 50 MG Tab Take 1 Tablet by mouth every 8 hours as needed for Mild Pain for up to 30 days. 90 Tablet 0    gabapentin (NEURONTIN) 300 MG Cap Take 1 Capsule by mouth 2 times a day. Indications: foot pain 180 Capsule 3    meloxicam (MOBIC) 15 MG tablet Take 1 Tablet by mouth every day. 90 Tablet 3    cyclobenzaprine (FLEXERIL) 10 mg Tab TAKE 1 TABLET BY MOUTH THREE TIMES DAILY AS NEEDED FOR MUSCLE SPASMS 270 Tablet 3    ketorolac (ACULAR) 0.5 % Solution Administer 1 Drop into both eyes 4 times a day. 5 mL 3    fluticasone (FLONASE) 50 MCG/ACT nasal spray Administer 2 Sprays into affected nostril(S) every day. 16 g 3    hydrocortisone 2.5 % Cream topical cream Apply 1 Application. topically 2 times a day. 30 g 3    oxybutynin (DITROPAN) 5 MG Tab TAKE 1 TABLET BY MOUTH EVERY EVENING 90 Tablet 3    hydrocortisone 1 % Cream Apply 1 Application. topically 2 times a day. 30 g 3    lisinopril-hydrochlorothiazide (PRINZIDE) 10-12.5 MG per tablet Take 1 Tablet by mouth every day. 90 Tablet 3    omeprazole (PRILOSEC) 20 MG delayed-release capsule TAKE 1 CAPSULE BY MOUTH EVERY DAY 90 Capsule 3    Naloxone (NARCAN) 4 MG/0.1ML Liquid One spray in one nostril for overdose and call 911. 1 Each 1    benzonatate (TESSALON) 100 MG Cap Take 1 Capsule by mouth 3 times a day as needed for Cough. (Patient not  taking: Reported on 12/1/2023) 60 Capsule 0     No current Epic-ordered facility-administered medications on file.       Health Maintenance:     ROS:  Gen: no fevers/chills, no changes in weight  Eyes: no changes in vision  ENT: no sore throat, no hearing loss, no bloody nose  Pulm: no sob, no cough  CV: no chest pain, no palpitations  GI: no nausea/vomiting, no diarrhea  : no dysuria  MSk: no myalgias  Skin: no rash  Neuro: no headaches, no numbness/tingling  Heme/Lymph: no easy bruising      Objective:     Exam:  BP (!) 160/84 (BP Location: Right arm, Patient Position: Sitting, BP Cuff Size: Adult)   Pulse 100   Resp 17   Wt 43.5 kg (96 lb)   SpO2 95%   BMI 17.56 kg/m²  Body mass index is 17.56 kg/m².    Gen: Alert and oriented, No apparent distress.  Neck: Neck is supple without lymphadenopathy.  Lungs: Normal effort, CTA bilaterally, no wheezes, rhonchi, or rales  CV: Regular rate and rhythm. No murmurs, rubs, or gallops.  Ext: No clubbing, cyanosis, edema.      Labs: See chart    Assessment & Plan:     68 y.o. female with the following -     Problem List Items Addressed This Visit       Plantar fasciitis     As per podiatrist.         HTN (hypertension)    Low back pain     Pain meds refilled  Nerve ablation as per pain management         Relevant Medications    HYDROcodone-acetaminophen (NORCO) 5-325 MG Tab per tablet (Start on 12/6/2023)    HYDROcodone-acetaminophen (NORCO) 5-325 MG Tab per tablet (Start on 1/5/2024)    HYDROcodone-acetaminophen (NORCO) 5-325 MG Tab per tablet (Start on 2/4/2024)    traMADol (ULTRAM) 50 MG Tab    traMADol (ULTRAM) 50 MG Tab (Start on 12/31/2023)    traMADol (ULTRAM) 50 MG Tab (Start on 1/30/2024)           Return in about 3 months (around 3/1/2024) for med refill.    Please note that this dictation was created using voice recognition software. I have made every reasonable attempt to correct obvious errors, but I expect that there are errors of grammar and possibly  content that I did not discover before finalizing the note.

## 2023-12-30 RX ORDER — LISINOPRIL AND HYDROCHLOROTHIAZIDE 12.5; 1 MG/1; MG/1
1 TABLET ORAL DAILY
Qty: 90 TABLET | Refills: 3 | Status: SHIPPED | OUTPATIENT
Start: 2023-12-30 | End: 2024-02-20 | Stop reason: SDUPTHER

## 2024-01-03 DIAGNOSIS — G89.29 CHRONIC BILATERAL LOW BACK PAIN WITHOUT SCIATICA: ICD-10-CM

## 2024-01-03 DIAGNOSIS — M54.50 CHRONIC BILATERAL LOW BACK PAIN WITHOUT SCIATICA: ICD-10-CM

## 2024-01-03 RX ORDER — TRAMADOL HYDROCHLORIDE 50 MG/1
50 TABLET ORAL EVERY 8 HOURS PRN
Qty: 90 TABLET | Refills: 0 | Status: SHIPPED | OUTPATIENT
Start: 2024-01-03 | End: 2024-02-02

## 2024-01-18 ENCOUNTER — APPOINTMENT (OUTPATIENT)
Dept: MEDICAL GROUP | Facility: CLINIC | Age: 69
End: 2024-01-18
Payer: MEDICARE

## 2024-01-31 RX ORDER — OXYBUTYNIN CHLORIDE 5 MG/1
5 TABLET ORAL NIGHTLY
Qty: 90 TABLET | Refills: 3 | Status: SHIPPED | OUTPATIENT
Start: 2024-01-31 | End: 2024-02-20 | Stop reason: SDUPTHER

## 2024-01-31 NOTE — TELEPHONE ENCOUNTER
Received request via: Pharmacy    Was the patient seen in the last year in this department? Yes    Does the patient have an active prescription (recently filled or refills available) for medication(s) requested? No    Pharmacy Name: [id number] - [place] - [address] AT [facility] [address]     Does the patient have custodial Plus and need 100 day supply (blood pressure, diabetes and cholesterol meds only)? Patient does not have SCP

## 2024-02-20 ENCOUNTER — OFFICE VISIT (OUTPATIENT)
Dept: MEDICAL GROUP | Facility: CLINIC | Age: 69
End: 2024-02-20

## 2024-02-20 VITALS
HEIGHT: 62 IN | DIASTOLIC BLOOD PRESSURE: 82 MMHG | BODY MASS INDEX: 18.03 KG/M2 | HEART RATE: 90 BPM | TEMPERATURE: 98.7 F | WEIGHT: 98 LBS | SYSTOLIC BLOOD PRESSURE: 126 MMHG | OXYGEN SATURATION: 95 %

## 2024-02-20 DIAGNOSIS — G89.29 CHRONIC BILATERAL LOW BACK PAIN WITHOUT SCIATICA: ICD-10-CM

## 2024-02-20 DIAGNOSIS — M54.50 CHRONIC BILATERAL LOW BACK PAIN WITHOUT SCIATICA: ICD-10-CM

## 2024-02-20 PROCEDURE — 3079F DIAST BP 80-89 MM HG: CPT | Performed by: FAMILY MEDICINE

## 2024-02-20 PROCEDURE — 3074F SYST BP LT 130 MM HG: CPT | Performed by: FAMILY MEDICINE

## 2024-02-20 PROCEDURE — 99214 OFFICE O/P EST MOD 30 MIN: CPT | Performed by: FAMILY MEDICINE

## 2024-02-20 RX ORDER — TRAMADOL HYDROCHLORIDE 50 MG/1
50 TABLET ORAL EVERY 8 HOURS PRN
Qty: 60 TABLET | Refills: 0 | Status: SHIPPED | OUTPATIENT
Start: 2024-04-21 | End: 2024-05-21

## 2024-02-20 RX ORDER — HYDROCODONE BITARTRATE AND ACETAMINOPHEN 5; 325 MG/1; MG/1
1 TABLET ORAL EVERY 4 HOURS PRN
Qty: 120 TABLET | Refills: 0 | Status: SHIPPED | OUTPATIENT
Start: 2024-04-06 | End: 2024-05-06

## 2024-02-20 RX ORDER — HYDROCODONE BITARTRATE AND ACETAMINOPHEN 5; 325 MG/1; MG/1
1 TABLET ORAL EVERY 4 HOURS PRN
Qty: 120 TABLET | Refills: 0 | Status: SHIPPED | OUTPATIENT
Start: 2024-05-06 | End: 2024-06-05

## 2024-02-20 RX ORDER — HYDROCODONE BITARTRATE AND ACETAMINOPHEN 5; 325 MG/1; MG/1
1 TABLET ORAL EVERY 4 HOURS PRN
Qty: 120 TABLET | Refills: 0 | Status: SHIPPED | OUTPATIENT
Start: 2024-03-07 | End: 2024-04-06

## 2024-02-20 RX ORDER — GABAPENTIN 300 MG/1
300 CAPSULE ORAL 2 TIMES DAILY
Qty: 180 CAPSULE | Refills: 3 | Status: SHIPPED | OUTPATIENT
Start: 2024-02-20

## 2024-02-20 RX ORDER — LISINOPRIL AND HYDROCHLOROTHIAZIDE 12.5; 1 MG/1; MG/1
1 TABLET ORAL DAILY
Qty: 90 TABLET | Refills: 3 | Status: SHIPPED | OUTPATIENT
Start: 2024-02-20

## 2024-02-20 RX ORDER — TRAMADOL HYDROCHLORIDE 50 MG/1
50 TABLET ORAL EVERY 8 HOURS PRN
Qty: 60 TABLET | Refills: 0 | Status: SHIPPED | OUTPATIENT
Start: 2024-02-20 | End: 2024-03-21

## 2024-02-20 RX ORDER — TRAMADOL HYDROCHLORIDE 50 MG/1
50 TABLET ORAL EVERY 8 HOURS PRN
Qty: 60 TABLET | Refills: 0 | Status: SHIPPED | OUTPATIENT
Start: 2024-03-21 | End: 2024-04-20

## 2024-02-20 RX ORDER — OXYBUTYNIN CHLORIDE 5 MG/1
5 TABLET ORAL NIGHTLY
Qty: 90 TABLET | Refills: 3 | Status: SHIPPED | OUTPATIENT
Start: 2024-02-20

## 2024-02-20 RX ORDER — MELOXICAM 15 MG/1
15 TABLET ORAL DAILY
Qty: 90 TABLET | Refills: 3 | Status: SHIPPED | OUTPATIENT
Start: 2024-02-20

## 2024-02-20 NOTE — PROGRESS NOTES
Subjective:     CC: Controlled substances refilled.  Multiple medications needed.    HPI:   Jemima presents today with most of her medications need to be refilled.  Especially needing her tramadol and hydrocodone.  The combination of both has been working well to help her wean off the hydrocodone.  Continues with continued Planter fasciitis.  Problems she is seeing her podiatrist.    Also here for blood pressure recheck.  Her blood pressure has been fine and it has been doing well at home.  She has had no lightheadedness no chest pain or palpitations.    Her back pain has been manageable.  She does not need any further physical therapy.    No problems updated.    Current Outpatient Medications Ordered in Epic   Medication Sig Dispense Refill    [START ON 3/7/2024] HYDROcodone-acetaminophen (NORCO) 5-325 MG Tab per tablet Take 1 Tablet by mouth every four hours as needed (severe pain) for up to 30 days. 120 Tablet 0    [START ON 4/6/2024] HYDROcodone-acetaminophen (NORCO) 5-325 MG Tab per tablet Take 1 Tablet by mouth every four hours as needed (severe pain) for up to 30 days. 120 Tablet 0    [START ON 5/6/2024] HYDROcodone-acetaminophen (NORCO) 5-325 MG Tab per tablet Take 1 Tablet by mouth every four hours as needed (severe pain) for up to 30 days. 120 Tablet 0    traMADol (ULTRAM) 50 MG Tab Take 1 Tablet by mouth every 8 hours as needed for Mild Pain for up to 30 days. 60 Tablet 0    [START ON 3/21/2024] traMADol (ULTRAM) 50 MG Tab Take 1 Tablet by mouth every 8 hours as needed for Mild Pain for up to 30 days. 60 Tablet 0    [START ON 4/21/2024] traMADol (ULTRAM) 50 MG Tab Take 1 Tablet by mouth every 8 hours as needed for Mild Pain for up to 30 days. 60 Tablet 0    gabapentin (NEURONTIN) 300 MG Cap Take 1 Capsule by mouth 2 times a day. Indications: foot pain 180 Capsule 3    meloxicam (MOBIC) 15 MG tablet Take 1 Tablet by mouth every day. 90 Tablet 3    oxybutynin (DITROPAN) 5 MG Tab Take 1 Tablet by mouth every  "evening. 90 Tablet 3    lisinopril-hydrochlorothiazide (PRINZIDE) 10-12.5 MG per tablet Take 1 Tablet by mouth every day. 90 Tablet 3    cyclobenzaprine (FLEXERIL) 10 mg Tab TAKE 1 TABLET BY MOUTH THREE TIMES DAILY AS NEEDED FOR MUSCLE SPASMS 270 Tablet 3    ketorolac (ACULAR) 0.5 % Solution Administer 1 Drop into both eyes 4 times a day. 5 mL 3    fluticasone (FLONASE) 50 MCG/ACT nasal spray Administer 2 Sprays into affected nostril(S) every day. 16 g 3    hydrocortisone 2.5 % Cream topical cream Apply 1 Application. topically 2 times a day. 30 g 3    hydrocortisone 1 % Cream Apply 1 Application. topically 2 times a day. 30 g 3    omeprazole (PRILOSEC) 20 MG delayed-release capsule TAKE 1 CAPSULE BY MOUTH EVERY DAY 90 Capsule 3    Naloxone (NARCAN) 4 MG/0.1ML Liquid One spray in one nostril for overdose and call 911. 1 Each 1    benzonatate (TESSALON) 100 MG Cap Take 1 Capsule by mouth 3 times a day as needed for Cough. (Patient not taking: Reported on 2/20/2024) 60 Capsule 0     No current Epic-ordered facility-administered medications on file.       Health Maintenance:     ROS:  Gen: no fevers/chills, no changes in weight  Eyes: no changes in vision  ENT: no sore throat, no hearing loss, no bloody nose  Pulm: no sob, no cough  CV: no chest pain, no palpitations  GI: no nausea/vomiting, no diarrhea  : no dysuria  MSk: no myalgias  Skin: no rash  Neuro: no headaches, no numbness/tingling  Heme/Lymph: no easy bruising      Objective:     Exam:  /82 (BP Location: Right arm, Patient Position: Sitting, BP Cuff Size: Small adult)   Pulse 90   Temp 37.1 °C (98.7 °F) (Temporal)   Ht 1.575 m (5' 2\")   Wt 44.5 kg (98 lb)   SpO2 95%   BMI 17.92 kg/m²  Body mass index is 17.92 kg/m².    Gen: Alert and oriented, No apparent distress.  Neck: Neck is supple without lymphadenopathy.  Lungs: Normal effort, CTA bilaterally, no wheezes, rhonchi, or rales  CV: Regular rate and rhythm. No murmurs, rubs, or " gallops.  Ext: No clubbing, cyanosis, edema.      Labs: None    Assessment & Plan:     69 y.o. female with the following -     Problem List Items Addressed This Visit       Low back pain    Relevant Medications    HYDROcodone-acetaminophen (NORCO) 5-325 MG Tab per tablet (Start on 3/7/2024)    HYDROcodone-acetaminophen (NORCO) 5-325 MG Tab per tablet (Start on 4/6/2024)    HYDROcodone-acetaminophen (NORCO) 5-325 MG Tab per tablet (Start on 5/6/2024)    traMADol (ULTRAM) 50 MG Tab    traMADol (ULTRAM) 50 MG Tab (Start on 3/21/2024)    traMADol (ULTRAM) 50 MG Tab (Start on 4/21/2024)    meloxicam (MOBIC) 15 MG tablet           Return in about 3 months (around 5/20/2024) for cs refill.    Please note that this dictation was created using voice recognition software. I have made every reasonable attempt to correct obvious errors, but I expect that there are errors of grammar and possibly content that I did not discover before finalizing the note.

## 2024-05-06 ENCOUNTER — APPOINTMENT (OUTPATIENT)
Dept: MEDICAL GROUP | Facility: CLINIC | Age: 69
End: 2024-05-06

## 2024-05-06 VITALS
SYSTOLIC BLOOD PRESSURE: 124 MMHG | OXYGEN SATURATION: 92 % | BODY MASS INDEX: 18.4 KG/M2 | TEMPERATURE: 98.2 F | HEIGHT: 62 IN | HEART RATE: 92 BPM | DIASTOLIC BLOOD PRESSURE: 74 MMHG | WEIGHT: 100 LBS | RESPIRATION RATE: 16 BRPM

## 2024-05-06 DIAGNOSIS — I10 PRIMARY HYPERTENSION: ICD-10-CM

## 2024-05-06 DIAGNOSIS — H01.119 ALLERGIC DERMATITIS OF EYELID, UNSPECIFIED LATERALITY: ICD-10-CM

## 2024-05-06 DIAGNOSIS — M54.50 CHRONIC BILATERAL LOW BACK PAIN WITHOUT SCIATICA: ICD-10-CM

## 2024-05-06 DIAGNOSIS — N32.81 OVERACTIVE BLADDER: ICD-10-CM

## 2024-05-06 DIAGNOSIS — G89.29 CHRONIC BILATERAL LOW BACK PAIN WITHOUT SCIATICA: ICD-10-CM

## 2024-05-06 PROBLEM — J06.9 VIRAL UPPER RESPIRATORY TRACT INFECTION: Status: RESOLVED | Noted: 2023-03-29 | Resolved: 2024-05-06

## 2024-05-06 PROBLEM — S86.892A LEFT MEDIAL TIBIAL STRESS SYNDROME: Status: RESOLVED | Noted: 2022-02-04 | Resolved: 2024-05-06

## 2024-05-06 PROCEDURE — 3074F SYST BP LT 130 MM HG: CPT | Performed by: FAMILY MEDICINE

## 2024-05-06 PROCEDURE — 3078F DIAST BP <80 MM HG: CPT | Performed by: FAMILY MEDICINE

## 2024-05-06 PROCEDURE — 99214 OFFICE O/P EST MOD 30 MIN: CPT | Performed by: FAMILY MEDICINE

## 2024-05-06 RX ORDER — MELOXICAM 15 MG/1
15 TABLET ORAL DAILY
Qty: 90 TABLET | Refills: 3 | Status: SHIPPED | OUTPATIENT
Start: 2024-05-06

## 2024-05-06 RX ORDER — KETOROLAC TROMETHAMINE 5 MG/ML
1 SOLUTION OPHTHALMIC 4 TIMES DAILY
Qty: 5 ML | Refills: 3 | Status: SHIPPED | OUTPATIENT
Start: 2024-05-06

## 2024-05-06 RX ORDER — TRAMADOL HYDROCHLORIDE 50 MG/1
50 TABLET ORAL EVERY 8 HOURS PRN
Qty: 60 TABLET | Refills: 0 | Status: SHIPPED | OUTPATIENT
Start: 2024-06-01 | End: 2024-07-01

## 2024-05-06 RX ORDER — GABAPENTIN 300 MG/1
300 CAPSULE ORAL 2 TIMES DAILY
Qty: 180 CAPSULE | Refills: 3 | Status: SHIPPED | OUTPATIENT
Start: 2024-05-06

## 2024-05-06 RX ORDER — LISINOPRIL AND HYDROCHLOROTHIAZIDE 12.5; 1 MG/1; MG/1
1 TABLET ORAL DAILY
Qty: 90 TABLET | Refills: 3 | Status: SHIPPED | OUTPATIENT
Start: 2024-05-06

## 2024-05-06 RX ORDER — HYDROCODONE BITARTRATE AND ACETAMINOPHEN 5; 325 MG/1; MG/1
1 TABLET ORAL EVERY 4 HOURS PRN
Qty: 120 TABLET | Refills: 0 | Status: SHIPPED | OUTPATIENT
Start: 2024-07-05 | End: 2024-08-04

## 2024-05-06 RX ORDER — HYDROCODONE BITARTRATE AND ACETAMINOPHEN 5; 325 MG/1; MG/1
1 TABLET ORAL EVERY 4 HOURS PRN
Qty: 120 TABLET | Refills: 0 | Status: SHIPPED | OUTPATIENT
Start: 2024-05-06 | End: 2024-06-05

## 2024-05-06 RX ORDER — HYDROCODONE BITARTRATE AND ACETAMINOPHEN 5; 325 MG/1; MG/1
1 TABLET ORAL EVERY 4 HOURS PRN
Qty: 120 TABLET | Refills: 0 | Status: SHIPPED | OUTPATIENT
Start: 2024-06-05 | End: 2024-07-05

## 2024-05-06 RX ORDER — OXYBUTYNIN CHLORIDE 5 MG/1
5 TABLET ORAL NIGHTLY
Qty: 90 TABLET | Refills: 3 | Status: SHIPPED | OUTPATIENT
Start: 2024-05-06

## 2024-05-06 RX ORDER — TRAMADOL HYDROCHLORIDE 50 MG/1
50 TABLET ORAL EVERY 8 HOURS PRN
Qty: 60 TABLET | Refills: 0 | Status: SHIPPED | OUTPATIENT
Start: 2024-07-01 | End: 2024-07-31

## 2024-05-06 ASSESSMENT — PATIENT HEALTH QUESTIONNAIRE - PHQ9: CLINICAL INTERPRETATION OF PHQ2 SCORE: 0

## 2024-05-06 NOTE — PROGRESS NOTES
Subjective:     CC: Medication refills    HPI:   Jemima presents today with problem of medication refills.  Needs a refill of her controlled substances that tramadol and the hydrocodone.  In addition she needs refills of her chronic disease medicines.    Her overactive bladder syndrome is under good control with her medication that she is taking.    Her back pain is slightly worse.  She is to get a back injection next week.  She would like refill of both her hydrocodone and tramadol.  She in addition takes gabapentin at night.  She describes to her back pain is bilateral shooting down to both feet.    Her blood pressure is in good control no palpitations or chest pain.    Problem   Overactive Bladder    On oxybutinin.  Working well needs refill     Allergic Blepharitis    Bilateral blepharitis.  Responded to acular.  With the new allergy season this has recurred.  She does need a refill of her Acular     Htn (Hypertension)    BP doing well.  Very high on intake today. Repeat /74     Low Back Pain    Patient with longstanding history of low back pain.  Is normally seen by Dr. Foreman for this, and has been taking Norco 7.5-325 mg tablets for multiple years.  She states she has never had any adverse reaction secondary to this medication, including confusion, falls, respiratory depression.    Back pain somewhat worse over the holidays    Needs norco refill  Tried to change to tramadol but got no pain relief.  Worried that medication may be interfering with memory.    In pain management.  Will get nerve ablation in a couple of weeks.    Here for medication refills.  He she is finding that combination of the 5 mg Norco with occasional tramadol seems to be effective for pain control.  She is getting back injection next week.     Viral Upper Respiratory Tract Infection (Resolved)    Sore throat and cough for about a week.  Eye pain.  Chills, no fever.  Very lethargic.   COVID negative.     Left Medial Tibial Stress  Syndrome (Resolved)    L shin splint         Current Outpatient Medications Ordered in Epic   Medication Sig Dispense Refill    oxybutynin (DITROPAN) 5 MG Tab Take 1 Tablet by mouth every evening. 90 Tablet 3    lisinopril-hydrochlorothiazide (PRINZIDE) 10-12.5 MG per tablet Take 1 Tablet by mouth every day. 90 Tablet 3    gabapentin (NEURONTIN) 300 MG Cap Take 1 Capsule by mouth 2 times a day. Indications: foot pain 180 Capsule 3    meloxicam (MOBIC) 15 MG tablet Take 1 Tablet by mouth every day. 90 Tablet 3    ketorolac (ACULAR) 0.5 % Solution Administer 1 Drop into both eyes 4 times a day. 5 mL 3    HYDROcodone-acetaminophen (NORCO) 5-325 MG Tab per tablet Take 1 Tablet by mouth every four hours as needed (severe pain) for up to 30 days. 120 Tablet 0    [START ON 6/5/2024] HYDROcodone-acetaminophen (NORCO) 5-325 MG Tab per tablet Take 1 Tablet by mouth every four hours as needed (severe pain) for up to 30 days. 120 Tablet 0    [START ON 7/5/2024] HYDROcodone-acetaminophen (NORCO) 5-325 MG Tab per tablet Take 1 Tablet by mouth every four hours as needed (severe pain) for up to 30 days. 120 Tablet 0    [START ON 7/1/2024] traMADol (ULTRAM) 50 MG Tab Take 1 Tablet by mouth every 8 hours as needed for Mild Pain for up to 30 days. 60 Tablet 0    [START ON 6/1/2024] traMADol (ULTRAM) 50 MG Tab Take 1 Tablet by mouth every 8 hours as needed for Mild Pain for up to 30 days. 60 Tablet 0    HYDROcodone-acetaminophen (NORCO) 5-325 MG Tab per tablet Take 1 Tablet by mouth every four hours as needed (severe pain) for up to 30 days. 120 Tablet 0    cyclobenzaprine (FLEXERIL) 10 mg Tab TAKE 1 TABLET BY MOUTH THREE TIMES DAILY AS NEEDED FOR MUSCLE SPASMS 270 Tablet 3    fluticasone (FLONASE) 50 MCG/ACT nasal spray Administer 2 Sprays into affected nostril(S) every day. 16 g 3    hydrocortisone 2.5 % Cream topical cream Apply 1 Application. topically 2 times a day. 30 g 3    hydrocortisone 1 % Cream Apply 1 Application. topically  "2 times a day. 30 g 3    omeprazole (PRILOSEC) 20 MG delayed-release capsule TAKE 1 CAPSULE BY MOUTH EVERY DAY 90 Capsule 3    Naloxone (NARCAN) 4 MG/0.1ML Liquid One spray in one nostril for overdose and call 911. 1 Each 1     No current Epic-ordered facility-administered medications on file.       Health Maintenance:     ROS:  Gen: no fevers/chills, no changes in weight  Eyes: no changes in vision  ENT: no sore throat, no hearing loss, no bloody nose  Pulm: no sob, no cough  CV: no chest pain, no palpitations  GI: no nausea/vomiting, no diarrhea  : no dysuria  MSk: no myalgias  Skin: no rash  Neuro: no headaches, no numbness/tingling  Heme/Lymph: no easy bruising      Objective:     Exam:  /74   Pulse 92   Temp 36.8 °C (98.2 °F) (Temporal)   Resp 16   Ht 1.575 m (5' 2\")   Wt 45.4 kg (100 lb)   SpO2 92%   BMI 18.29 kg/m²  Body mass index is 18.29 kg/m².    Gen: Alert and oriented, No apparent distress.  Neck: Neck is supple without lymphadenopathy.  Lungs: Normal effort, CTA bilaterally, no wheezes, rhonchi, or rales  CV: Regular rate and rhythm. No murmurs, rubs, or gallops.  Ext: No clubbing, cyanosis, edema.      Labs: None    Assessment & Plan:     69 y.o. female with the following -     Problem List Items Addressed This Visit       HTN (hypertension)     Continue current management         Relevant Medications    lisinopril-hydrochlorothiazide (PRINZIDE) 10-12.5 MG per tablet    Low back pain     Refill hydrocodone and tramadol.         Relevant Medications    meloxicam (MOBIC) 15 MG tablet    HYDROcodone-acetaminophen (NORCO) 5-325 MG Tab per tablet    HYDROcodone-acetaminophen (NORCO) 5-325 MG Tab per tablet (Start on 6/5/2024)    HYDROcodone-acetaminophen (NORCO) 5-325 MG Tab per tablet (Start on 7/5/2024)    traMADol (ULTRAM) 50 MG Tab (Start on 7/1/2024)    traMADol (ULTRAM) 50 MG Tab (Start on 6/1/2024)    Other Relevant Orders    Controlled Substance Treatment Agreement    Allergic " blepharitis     Refill medication         Overactive bladder     Needs refill on medication                No follow-ups on file.    Please note that this dictation was created using voice recognition software. I have made every reasonable attempt to correct obvious errors, but I expect that there are errors of grammar and possibly content that I did not discover before finalizing the note.

## 2024-07-18 RX ORDER — CYCLOBENZAPRINE HCL 10 MG
TABLET ORAL
Qty: 270 TABLET | Refills: 3 | Status: SHIPPED | OUTPATIENT
Start: 2024-07-18

## 2024-08-05 ENCOUNTER — APPOINTMENT (OUTPATIENT)
Dept: MEDICAL GROUP | Facility: CLINIC | Age: 69
End: 2024-08-05

## 2024-08-05 VITALS
TEMPERATURE: 98.3 F | WEIGHT: 97.8 LBS | HEART RATE: 105 BPM | HEIGHT: 62 IN | DIASTOLIC BLOOD PRESSURE: 65 MMHG | OXYGEN SATURATION: 96 % | BODY MASS INDEX: 18 KG/M2 | SYSTOLIC BLOOD PRESSURE: 104 MMHG

## 2024-08-05 DIAGNOSIS — Z12.11 SCREENING FOR COLORECTAL CANCER: ICD-10-CM

## 2024-08-05 DIAGNOSIS — Z23 NEED FOR VACCINATION: ICD-10-CM

## 2024-08-05 DIAGNOSIS — Z12.31 ENCOUNTER FOR SCREENING MAMMOGRAM FOR BREAST CANCER: ICD-10-CM

## 2024-08-05 DIAGNOSIS — Z11.59 NEED FOR HEPATITIS C SCREENING TEST: ICD-10-CM

## 2024-08-05 DIAGNOSIS — G89.4 CHRONIC PAIN SYNDROME: ICD-10-CM

## 2024-08-05 DIAGNOSIS — N95.1 MENOPAUSAL STATE: ICD-10-CM

## 2024-08-05 DIAGNOSIS — Z12.12 SCREENING FOR COLORECTAL CANCER: ICD-10-CM

## 2024-08-05 DIAGNOSIS — Z78.0 POSTMENOPAUSAL STATUS (AGE-RELATED) (NATURAL): ICD-10-CM

## 2024-08-05 PROCEDURE — 99213 OFFICE O/P EST LOW 20 MIN: CPT | Mod: 25 | Performed by: FAMILY MEDICINE

## 2024-08-05 PROCEDURE — 90715 TDAP VACCINE 7 YRS/> IM: CPT | Performed by: FAMILY MEDICINE

## 2024-08-05 PROCEDURE — 90471 IMMUNIZATION ADMIN: CPT | Performed by: FAMILY MEDICINE

## 2024-08-05 RX ORDER — TRAMADOL HYDROCHLORIDE 50 MG/1
50 TABLET ORAL EVERY 4 HOURS PRN
Qty: 60 TABLET | Refills: 0 | Status: SHIPPED | OUTPATIENT
Start: 2024-10-04 | End: 2024-11-03

## 2024-08-05 RX ORDER — HYDROCODONE BITARTRATE AND ACETAMINOPHEN 5; 325 MG/1; MG/1
1 TABLET ORAL EVERY 4 HOURS PRN
Qty: 120 TABLET | Refills: 0 | Status: SHIPPED | OUTPATIENT
Start: 2024-10-04 | End: 2024-11-03

## 2024-08-05 RX ORDER — HYDROCODONE BITARTRATE AND ACETAMINOPHEN 5; 325 MG/1; MG/1
1 TABLET ORAL EVERY 4 HOURS PRN
Qty: 120 TABLET | Refills: 0 | Status: SHIPPED | OUTPATIENT
Start: 2024-09-04 | End: 2024-10-04

## 2024-08-05 RX ORDER — HYDROCODONE BITARTRATE AND ACETAMINOPHEN 5; 325 MG/1; MG/1
1 TABLET ORAL EVERY 4 HOURS PRN
Qty: 120 TABLET | Refills: 0 | Status: SHIPPED | OUTPATIENT
Start: 2024-08-05 | End: 2024-09-04

## 2024-08-05 RX ORDER — TRAMADOL HYDROCHLORIDE 50 MG/1
50 TABLET ORAL EVERY 4 HOURS PRN
Qty: 60 TABLET | Refills: 0 | Status: SHIPPED | OUTPATIENT
Start: 2024-08-05 | End: 2024-09-04

## 2024-08-05 RX ORDER — SODIUM FLUORIDE 5 MG/G
CREAM DENTAL
COMMUNITY
Start: 2024-07-15

## 2024-08-05 RX ORDER — TRAMADOL HYDROCHLORIDE 50 MG/1
50 TABLET ORAL EVERY 4 HOURS PRN
Qty: 60 TABLET | Refills: 0 | Status: SHIPPED | OUTPATIENT
Start: 2024-09-04 | End: 2024-10-04

## 2024-08-05 NOTE — PROGRESS NOTES
Subjective:     CC: Medicine refills    HPI:   Jemima presents today with a need for medicine refills.  She states her back and feet pain are just about the same as always.  She has taken a a alternating dose of tramadol with hydrocodone.  No other acute symptoms.    No problems updated.    Current Outpatient Medications Ordered in Epic   Medication Sig Dispense Refill    SODIUM FLUORIDE 5000 PLUS 1.1 % Cream USE TO BRUSH TEETH TWICE DAILY      traMADol (ULTRAM) 50 MG Tab Take 1 Tablet by mouth every four hours as needed for Mild Pain for up to 30 days. 60 Tablet 0    [START ON 9/4/2024] traMADol (ULTRAM) 50 MG Tab Take 1 Tablet by mouth every four hours as needed for Mild Pain for up to 30 days. 60 Tablet 0    [START ON 10/4/2024] traMADol (ULTRAM) 50 MG Tab Take 1 Tablet by mouth every four hours as needed for Mild Pain for up to 30 days. 60 Tablet 0    HYDROcodone-acetaminophen (NORCO) 5-325 MG Tab per tablet Take 1 Tablet by mouth every four hours as needed (severe pain) for up to 30 days. 120 Tablet 0    [START ON 9/4/2024] HYDROcodone-acetaminophen (NORCO) 5-325 MG Tab per tablet Take 1 Tablet by mouth every four hours as needed (severe pain) for up to 30 days. 120 Tablet 0    [START ON 10/4/2024] HYDROcodone-acetaminophen (NORCO) 5-325 MG Tab per tablet Take 1 Tablet by mouth every four hours as needed (severe pain) for up to 30 days. 120 Tablet 0    cyclobenzaprine (FLEXERIL) 10 mg Tab TAKE 1 TABLET BY MOUTH THREE TIMES DAILY AS NEEDED FOR MUSCLE SPASMS 270 Tablet 3    oxybutynin (DITROPAN) 5 MG Tab Take 1 Tablet by mouth every evening. 90 Tablet 3    lisinopril-hydrochlorothiazide (PRINZIDE) 10-12.5 MG per tablet Take 1 Tablet by mouth every day. 90 Tablet 3    gabapentin (NEURONTIN) 300 MG Cap Take 1 Capsule by mouth 2 times a day. Indications: foot pain 180 Capsule 3    meloxicam (MOBIC) 15 MG tablet Take 1 Tablet by mouth every day. 90 Tablet 3    ketorolac (ACULAR) 0.5 % Solution Administer 1 Drop into  "both eyes 4 times a day. 5 mL 3    fluticasone (FLONASE) 50 MCG/ACT nasal spray Administer 2 Sprays into affected nostril(S) every day. 16 g 3    hydrocortisone 2.5 % Cream topical cream Apply 1 Application. topically 2 times a day. 30 g 3    hydrocortisone 1 % Cream Apply 1 Application. topically 2 times a day. 30 g 3    omeprazole (PRILOSEC) 20 MG delayed-release capsule TAKE 1 CAPSULE BY MOUTH EVERY DAY 90 Capsule 3    Naloxone (NARCAN) 4 MG/0.1ML Liquid One spray in one nostril for overdose and call 911. 1 Each 1     No current [facility]-ordered facility-administered medications on file.       Health Maintenance:     ROS:  Gen: no fevers/chills, no changes in weight  Eyes: no changes in vision  ENT: no sore throat, no hearing loss, no bloody nose  Pulm: no sob, no cough  CV: no chest pain, no palpitations  GI: no nausea/vomiting, no diarrhea  : no dysuria  MSk: no myalgias  Skin: no rash  Neuro: no headaches, no numbness/tingling  Heme/Lymph: no easy bruising      Objective:     Exam:  /65 (BP Location: Left arm, Patient Position: Sitting, BP Cuff Size: Adult)   Pulse (!) 105   Temp 36.8 °C (98.3 °F) (Temporal)   Ht 1.575 m (5' 2\")   Wt 44.4 kg (97 lb 12.8 oz)   SpO2 96%   BMI 17.89 kg/m²  Body mass index is 17.89 kg/m².    Gen: Alert and oriented, No apparent distress.  Neck: Neck is supple without lymphadenopathy.  Lungs: Normal effort, CTA bilaterally, no wheezes, rhonchi, or rales  CV: Regular rate and rhythm. No murmurs, rubs, or gallops.  Ext: No clubbing, cyanosis, edema.      Labs: none    Assessment & Plan:     69 y.o. female with the following -     Problem List Items Addressed This Visit          Family Medicine Problems    Chronic pain syndrome    Relevant Medications    traMADol (ULTRAM) 50 MG Tab    traMADol (ULTRAM) 50 MG Tab (Start on 9/4/2024)    traMADol (ULTRAM) 50 MG Tab (Start on 10/4/2024)    HYDROcodone-acetaminophen (NORCO) 5-325 MG Tab per tablet    HYDROcodone-acetaminophen " (NORCO) 5-325 MG Tab per tablet (Start on 9/4/2024)    HYDROcodone-acetaminophen (NORCO) 5-325 MG Tab per tablet (Start on 10/4/2024)     Other Visit Diagnoses       Postmenopausal status (age-related) (natural)        Menopausal state        Need for hepatitis C screening test        Need for vaccination        Relevant Orders    Tdap Vaccine =>6YO IM (Completed)    Screening for colorectal cancer        Relevant Orders    COLOGUARD (FIT DNA)    Encounter for screening mammogram for breast cancer        Relevant Orders    MA-SCREENING MAMMO BILAT W/CAD                No follow-ups on file.    Please note that this dictation was created using voice recognition software. I have made every reasonable attempt to correct obvious errors, but I expect that there are errors of grammar and possibly content that I did not discover before finalizing the note.

## 2024-11-02 DIAGNOSIS — G89.4 CHRONIC PAIN SYNDROME: ICD-10-CM

## 2024-11-04 NOTE — TELEPHONE ENCOUNTER
Received request via: Pharmacy    Was the patient seen in the last year in this department? No    Does the patient have an active prescription (recently filled or refills available) for medication(s) requested? No    Pharmacy Name:   [id number] - [place], [place] - [address]       Does the patient have alf Plus and need 100-day supply? (This applies to ALL medications) Patient does not have SCP

## 2024-11-05 RX ORDER — TRAMADOL HYDROCHLORIDE 50 MG/1
50 TABLET ORAL EVERY 4 HOURS PRN
Qty: 60 TABLET | Refills: 0 | Status: SHIPPED | OUTPATIENT
Start: 2024-11-05 | End: 2024-12-05

## 2024-11-06 ENCOUNTER — APPOINTMENT (OUTPATIENT)
Dept: MEDICAL GROUP | Facility: CLINIC | Age: 69
End: 2024-11-06
Payer: MEDICARE

## 2024-11-06 VITALS
BODY MASS INDEX: 18.95 KG/M2 | HEIGHT: 62 IN | WEIGHT: 103 LBS | OXYGEN SATURATION: 94 % | HEART RATE: 97 BPM | RESPIRATION RATE: 17 BRPM | SYSTOLIC BLOOD PRESSURE: 100 MMHG | DIASTOLIC BLOOD PRESSURE: 64 MMHG

## 2024-11-06 DIAGNOSIS — I10 PRIMARY HYPERTENSION: ICD-10-CM

## 2024-11-06 DIAGNOSIS — G89.4 CHRONIC PAIN SYNDROME: ICD-10-CM

## 2024-11-06 DIAGNOSIS — R41.3 MEMORY LOSS: ICD-10-CM

## 2024-11-06 DIAGNOSIS — M72.2 PLANTAR FASCIITIS: ICD-10-CM

## 2024-11-06 DIAGNOSIS — M54.50 CHRONIC BILATERAL LOW BACK PAIN WITHOUT SCIATICA: ICD-10-CM

## 2024-11-06 DIAGNOSIS — G89.29 CHRONIC BILATERAL LOW BACK PAIN WITHOUT SCIATICA: ICD-10-CM

## 2024-11-06 PROCEDURE — 3078F DIAST BP <80 MM HG: CPT | Performed by: FAMILY MEDICINE

## 2024-11-06 PROCEDURE — 99214 OFFICE O/P EST MOD 30 MIN: CPT | Performed by: FAMILY MEDICINE

## 2024-11-06 PROCEDURE — 3074F SYST BP LT 130 MM HG: CPT | Performed by: FAMILY MEDICINE

## 2024-11-06 RX ORDER — MELOXICAM 15 MG/1
15 TABLET ORAL DAILY
Qty: 90 TABLET | Refills: 3 | Status: SHIPPED | OUTPATIENT
Start: 2024-11-06

## 2024-11-06 RX ORDER — HYDROCODONE BITARTRATE AND ACETAMINOPHEN 5; 325 MG/1; MG/1
1 TABLET ORAL EVERY 4 HOURS PRN
Qty: 120 TABLET | Refills: 0 | Status: SHIPPED | OUTPATIENT
Start: 2025-01-06 | End: 2025-02-05

## 2024-11-06 RX ORDER — GABAPENTIN 300 MG/1
300 CAPSULE ORAL 2 TIMES DAILY
Qty: 180 CAPSULE | Refills: 3 | Status: SHIPPED | OUTPATIENT
Start: 2024-11-06

## 2024-11-06 RX ORDER — LISINOPRIL AND HYDROCHLOROTHIAZIDE 10; 12.5 MG/1; MG/1
1 TABLET ORAL DAILY
Qty: 90 TABLET | Refills: 3 | Status: SHIPPED | OUTPATIENT
Start: 2024-11-06

## 2024-11-06 RX ORDER — KETOROLAC TROMETHAMINE 5 MG/ML
1 SOLUTION OPHTHALMIC 4 TIMES DAILY
Qty: 5 ML | Refills: 3 | Status: SHIPPED | OUTPATIENT
Start: 2024-11-06

## 2024-11-06 RX ORDER — HYDROCODONE BITARTRATE AND ACETAMINOPHEN 5; 325 MG/1; MG/1
1 TABLET ORAL EVERY 4 HOURS PRN
Qty: 120 TABLET | Refills: 0 | Status: SHIPPED | OUTPATIENT
Start: 2024-12-06 | End: 2025-01-05

## 2024-11-06 RX ORDER — HYDROCODONE BITARTRATE AND ACETAMINOPHEN 5; 325 MG/1; MG/1
1 TABLET ORAL EVERY 4 HOURS PRN
Qty: 120 TABLET | Refills: 0 | Status: SHIPPED | OUTPATIENT
Start: 2024-11-06 | End: 2024-12-06

## 2024-11-06 RX ORDER — CYCLOBENZAPRINE HCL 10 MG
10 TABLET ORAL 3 TIMES DAILY PRN
Qty: 270 TABLET | Refills: 3 | Status: SHIPPED | OUTPATIENT
Start: 2024-11-06

## 2024-11-06 NOTE — ASSESSMENT & PLAN NOTE
Had neurodiagnostic testing and saw neurologist.  Agrees that patient has mild dementia.  This is somewhat exacerbated by her pain medicines.  No direct treatment at this time.  Her  will check in with the hospital dementia support group.

## 2024-11-06 NOTE — PROGRESS NOTES
Subjective:     CC: Here for pain medicine refills.    HPI:   Jemima presents today with as above.  She is out of her hydrocodone.  She states that she had a slip couple weeks ago with an exacerbation of low back pain.  Her feet are especially tender as well.  She is due to have foot surgery however beginning became nervous about the surgery and is elected to have injections as well.    She went to neurologist and had neurodiagnostic testing for memory loss.  Felt this is most likely due to mild Alzheimer's disease.  The  states that she is commonly forgetting her way home.    Problem   Memory Loss    Noticed memory issues over last year.  Has had periods of confusion and unable to find way home.  Cannot remember names     Chronic Pain Syndrome    About the same.  Needs med refill     Low Back Pain    Patient with longstanding history of low back pain.  Is normally seen by Dr. Foreman for this, and has been taking Norco 7.5-325 mg tablets for multiple years.  She states she has never had any adverse reaction secondary to this medication, including confusion, falls, respiratory depression.    Back pain somewhat worse over the holidays    Needs norco refill  Tried to change to tramadol but got no pain relief.  Worried that medication may be interfering with memory.    In pain management.  Will get nerve ablation in a couple of weeks.    Here for medication refills.  He she is finding that combination of the 5 mg Norco with occasional tramadol seems to be effective for pain control.  She is getting back injection next week.     Plantar Fasciitis    Just got PRP treatment of both feet.  Helped a lot    New foot doctor.  PRP not helpful.  Gets mild relief with cortisone foot injections         Current Outpatient Medications Ordered in Epic   Medication Sig Dispense Refill    cyclobenzaprine (FLEXERIL) 10 mg Tab Take 1 Tablet by mouth 3 times a day as needed for Muscle Spasms. 270 Tablet 3    gabapentin (NEURONTIN)  300 MG Cap Take 1 Capsule by mouth 2 times a day. Indications: foot pain 180 Capsule 3    lisinopril-hydrochlorothiazide (PRINZIDE) 10-12.5 MG per tablet Take 1 Tablet by mouth every day. 90 Tablet 3    meloxicam (MOBIC) 15 MG tablet Take 1 Tablet by mouth every day. 90 Tablet 3    HYDROcodone-acetaminophen (NORCO) 5-325 MG Tab per tablet Take 1 Tablet by mouth every four hours as needed (severe pain) for up to 30 days. 120 Tablet 0    [START ON 12/6/2024] HYDROcodone-acetaminophen (NORCO) 5-325 MG Tab per tablet Take 1 Tablet by mouth every four hours as needed (severe pain) for up to 30 days. 120 Tablet 0    [START ON 1/6/2025] HYDROcodone-acetaminophen (NORCO) 5-325 MG Tab per tablet Take 1 Tablet by mouth every four hours as needed (severe pain) for up to 30 days. 120 Tablet 0    ketorolac (ACULAR) 0.5 % Solution Administer 1 Drop into both eyes 4 times a day. 5 mL 3    traMADol (ULTRAM) 50 MG Tab Take 1 Tablet by mouth every four hours as needed for Mild Pain for up to 30 days. 60 Tablet 0    SODIUM FLUORIDE 5000 PLUS 1.1 % Cream USE TO BRUSH TEETH TWICE DAILY      oxybutynin (DITROPAN) 5 MG Tab Take 1 Tablet by mouth every evening. 90 Tablet 3    fluticasone (FLONASE) 50 MCG/ACT nasal spray Administer 2 Sprays into affected nostril(S) every day. 16 g 3    hydrocortisone 2.5 % Cream topical cream Apply 1 Application. topically 2 times a day. 30 g 3    hydrocortisone 1 % Cream Apply 1 Application. topically 2 times a day. 30 g 3    omeprazole (PRILOSEC) 20 MG delayed-release capsule TAKE 1 CAPSULE BY MOUTH EVERY DAY 90 Capsule 3    Naloxone (NARCAN) 4 MG/0.1ML Liquid One spray in one nostril for overdose and call 911. 1 Each 1     No current Epic-ordered facility-administered medications on file.       Health Maintenance:     ROS:  Gen: no fevers/chills, no changes in weight  Eyes: no changes in vision  ENT: no sore throat, no hearing loss, no bloody nose  Pulm: no sob, no cough  CV: no chest pain, no  "palpitations  GI: no nausea/vomiting, no diarrhea  : no dysuria  MSk: no myalgias  Skin: no rash  Neuro: no headaches, no numbness/tingling  Heme/Lymph: no easy bruising      Objective:     Exam:  /64 (BP Location: Left arm, Patient Position: Sitting, BP Cuff Size: Adult)   Pulse 97   Resp 17   Ht 1.575 m (5' 2\")   Wt 46.7 kg (103 lb)   SpO2 94%   BMI 18.84 kg/m²  Body mass index is 18.84 kg/m².    Gen: Alert and oriented, No apparent distress.  Neck: Neck is supple without lymphadenopathy.  Lungs: Normal effort, CTA bilaterally, no wheezes, rhonchi, or rales  CV: Regular rate and rhythm. No murmurs, rubs, or gallops.  Ext: No clubbing, cyanosis, edema.      Labs: No new labs.    Assessment & Plan:     69 y.o. female with the following -     Problem List Items Addressed This Visit          Family Medicine Problems    Chronic pain syndrome     Time for refills         Relevant Medications    cyclobenzaprine (FLEXERIL) 10 mg Tab    gabapentin (NEURONTIN) 300 MG Cap    meloxicam (MOBIC) 15 MG tablet    HYDROcodone-acetaminophen (NORCO) 5-325 MG Tab per tablet    HYDROcodone-acetaminophen (NORCO) 5-325 MG Tab per tablet (Start on 12/6/2024)    HYDROcodone-acetaminophen (NORCO) 5-325 MG Tab per tablet (Start on 1/6/2025)       Other    Plantar fasciitis     Scheduled for foot surgery.  Getting injections and working variably.  But concerned about surgery.    As per podiatry.         Relevant Medications    HYDROcodone-acetaminophen (NORCO) 5-325 MG Tab per tablet    HYDROcodone-acetaminophen (NORCO) 5-325 MG Tab per tablet (Start on 12/6/2024)    HYDROcodone-acetaminophen (NORCO) 5-325 MG Tab per tablet (Start on 1/6/2025)    Low back pain     Pain slightly worse  Will refill meds.  Not wanting to resume physical therapy.         Relevant Medications    cyclobenzaprine (FLEXERIL) 10 mg Tab    meloxicam (MOBIC) 15 MG tablet    HYDROcodone-acetaminophen (NORCO) 5-325 MG Tab per tablet    " HYDROcodone-acetaminophen (NORCO) 5-325 MG Tab per tablet (Start on 12/6/2024)    HYDROcodone-acetaminophen (NORCO) 5-325 MG Tab per tablet (Start on 1/6/2025)    Memory loss     Had neurodiagnostic testing and saw neurologist.  Agrees that patient has mild dementia.  This is somewhat exacerbated by her pain medicines.  No direct treatment at this time.  Her  will check in with the [facility] dementia support group.                Return in about 3 months (around 2/6/2025) for med refills.    Please note that this dictation was created using voice recognition software. I have made every reasonable attempt to correct obvious errors, but I expect that there are errors of grammar and possibly content that I did not discover before finalizing the note.

## 2024-11-06 NOTE — ASSESSMENT & PLAN NOTE
Scheduled for foot surgery.  Getting injections and working variably.  But concerned about surgery.    As per podiatry.

## 2024-12-09 ENCOUNTER — TELEPHONE (OUTPATIENT)
Dept: MEDICAL GROUP | Facility: CLINIC | Age: 69
End: 2024-12-09
Payer: MEDICARE

## 2024-12-09 DIAGNOSIS — M54.50 CHRONIC BILATERAL LOW BACK PAIN WITHOUT SCIATICA: ICD-10-CM

## 2024-12-09 DIAGNOSIS — G89.4 CHRONIC PAIN SYNDROME: ICD-10-CM

## 2024-12-09 DIAGNOSIS — G89.29 CHRONIC BILATERAL LOW BACK PAIN WITHOUT SCIATICA: ICD-10-CM

## 2024-12-09 RX ORDER — TRAMADOL HYDROCHLORIDE 50 MG/1
50 TABLET ORAL EVERY 4 HOURS PRN
Qty: 60 TABLET | Refills: 0 | Status: SHIPPED | OUTPATIENT
Start: 2024-12-09 | End: 2025-01-08

## 2025-02-05 ENCOUNTER — APPOINTMENT (OUTPATIENT)
Dept: MEDICAL GROUP | Facility: CLINIC | Age: 70
End: 2025-02-05
Payer: MEDICARE

## 2025-02-05 VITALS
HEART RATE: 94 BPM | TEMPERATURE: 97.6 F | WEIGHT: 106 LBS | HEIGHT: 62 IN | SYSTOLIC BLOOD PRESSURE: 112 MMHG | OXYGEN SATURATION: 95 % | BODY MASS INDEX: 19.51 KG/M2 | DIASTOLIC BLOOD PRESSURE: 68 MMHG

## 2025-02-05 DIAGNOSIS — G57.02 SCIATIC NERVE LESION, LEFT: ICD-10-CM

## 2025-02-05 DIAGNOSIS — Z23 FLU VACCINE NEED: ICD-10-CM

## 2025-02-05 DIAGNOSIS — M54.50 CHRONIC BILATERAL LOW BACK PAIN WITHOUT SCIATICA: ICD-10-CM

## 2025-02-05 DIAGNOSIS — G89.29 CHRONIC BILATERAL LOW BACK PAIN WITHOUT SCIATICA: ICD-10-CM

## 2025-02-05 DIAGNOSIS — G89.4 CHRONIC PAIN SYNDROME: ICD-10-CM

## 2025-02-05 DIAGNOSIS — M72.2 PLANTAR FASCIITIS: ICD-10-CM

## 2025-02-05 PROCEDURE — 90656 IIV3 VACC NO PRSV 0.5 ML IM: CPT | Performed by: FAMILY MEDICINE

## 2025-02-05 PROCEDURE — 90471 IMMUNIZATION ADMIN: CPT | Performed by: FAMILY MEDICINE

## 2025-02-05 PROCEDURE — 3078F DIAST BP <80 MM HG: CPT | Performed by: FAMILY MEDICINE

## 2025-02-05 PROCEDURE — 3074F SYST BP LT 130 MM HG: CPT | Performed by: FAMILY MEDICINE

## 2025-02-05 PROCEDURE — 99214 OFFICE O/P EST MOD 30 MIN: CPT | Mod: 25 | Performed by: FAMILY MEDICINE

## 2025-02-05 RX ORDER — HYDROCODONE BITARTRATE AND ACETAMINOPHEN 5; 325 MG/1; MG/1
1 TABLET ORAL EVERY 4 HOURS PRN
Qty: 120 TABLET | Refills: 0 | Status: SHIPPED | OUTPATIENT
Start: 2025-02-05 | End: 2025-03-07

## 2025-02-05 RX ORDER — TRAMADOL HYDROCHLORIDE 50 MG/1
50 TABLET ORAL EVERY 4 HOURS PRN
Qty: 60 TABLET | Refills: 0 | Status: SHIPPED | OUTPATIENT
Start: 2025-03-07 | End: 2025-04-06

## 2025-02-05 RX ORDER — TRAMADOL HYDROCHLORIDE 50 MG/1
50 TABLET ORAL EVERY 4 HOURS PRN
Qty: 60 TABLET | Refills: 0 | Status: SHIPPED | OUTPATIENT
Start: 2025-04-06 | End: 2025-05-06

## 2025-02-05 RX ORDER — TRAMADOL HYDROCHLORIDE 50 MG/1
50 TABLET ORAL EVERY 4 HOURS PRN
Qty: 60 TABLET | Refills: 0 | Status: SHIPPED | OUTPATIENT
Start: 2025-02-05 | End: 2025-03-07

## 2025-02-05 RX ORDER — HYDROCODONE BITARTRATE AND ACETAMINOPHEN 5; 325 MG/1; MG/1
1 TABLET ORAL EVERY 4 HOURS PRN
Qty: 120 TABLET | Refills: 0 | Status: SHIPPED | OUTPATIENT
Start: 2025-04-08 | End: 2025-05-08

## 2025-02-05 RX ORDER — HYDROCODONE BITARTRATE AND ACETAMINOPHEN 5; 325 MG/1; MG/1
1 TABLET ORAL EVERY 4 HOURS PRN
Qty: 120 TABLET | Refills: 0 | Status: SHIPPED | OUTPATIENT
Start: 2025-03-07 | End: 2025-04-06

## 2025-02-05 ASSESSMENT — PATIENT HEALTH QUESTIONNAIRE - PHQ9: CLINICAL INTERPRETATION OF PHQ2 SCORE: 0

## 2025-02-05 NOTE — PROGRESS NOTES
Subjective:     CC: Chronic pain syndrome, allergic blepharitis, medication refill    HPI:   Jemima presents today with the above problems.  Her pain management is adequate doing combination of hydrocodone and tramadol.  She also has continued problems with her feet and she is seeing podiatry for injection therapy.  She also has chronic sciatica and she is seeing pain pain management and they do roughly every 3-month injections for that.  She does notice that her eyes have been more dry lately and she needs a refill of her ocular    Problem   Sciatic Nerve Lesion, Left       Current Outpatient Medications Ordered in Epic   Medication Sig Dispense Refill    HYDROcodone-acetaminophen (NORCO) 5-325 MG Tab per tablet Take 1 Tablet by mouth every four hours as needed (severe pain) for up to 30 days. 120 Tablet 0    [START ON 3/7/2025] HYDROcodone-acetaminophen (NORCO) 5-325 MG Tab per tablet Take 1 Tablet by mouth every four hours as needed (severe pain) for up to 30 days. 120 Tablet 0    [START ON 4/8/2025] HYDROcodone-acetaminophen (NORCO) 5-325 MG Tab per tablet Take 1 Tablet by mouth every four hours as needed (severe pain) for up to 30 days. 120 Tablet 0    traMADol (ULTRAM) 50 MG Tab Take 1 Tablet by mouth every four hours as needed for Moderate Pain for up to 30 days. 60 Tablet 0    [START ON 3/7/2025] traMADol (ULTRAM) 50 MG Tab Take 1 Tablet by mouth every four hours as needed for Moderate Pain for up to 30 days. 60 Tablet 0    [START ON 4/6/2025] traMADol (ULTRAM) 50 MG Tab Take 1 Tablet by mouth every four hours as needed for Moderate Pain for up to 30 days. 60 Tablet 0    cyclobenzaprine (FLEXERIL) 10 mg Tab Take 1 Tablet by mouth 3 times a day as needed for Muscle Spasms. 270 Tablet 3    gabapentin (NEURONTIN) 300 MG Cap Take 1 Capsule by mouth 2 times a day. Indications: foot pain 180 Capsule 3    lisinopril-hydrochlorothiazide (PRINZIDE) 10-12.5 MG per tablet Take 1 Tablet by mouth every day. 90 Tablet 3     "meloxicam (MOBIC) 15 MG tablet Take 1 Tablet by mouth every day. 90 Tablet 3    ketorolac (ACULAR) 0.5 % Solution Administer 1 Drop into both eyes 4 times a day. 5 mL 3    SODIUM FLUORIDE 5000 PLUS 1.1 % Cream USE TO BRUSH TEETH TWICE DAILY      oxybutynin (DITROPAN) 5 MG Tab Take 1 Tablet by mouth every evening. 90 Tablet 3    fluticasone (FLONASE) 50 MCG/ACT nasal spray Administer 2 Sprays into affected nostril(S) every day. 16 g 3    hydrocortisone 2.5 % Cream topical cream Apply 1 Application. topically 2 times a day. 30 g 3    hydrocortisone 1 % Cream Apply 1 Application. topically 2 times a day. 30 g 3    omeprazole (PRILOSEC) 20 MG delayed-release capsule TAKE 1 CAPSULE BY MOUTH EVERY DAY 90 Capsule 3    Naloxone (NARCAN) 4 MG/0.1ML Liquid One spray in one nostril for overdose and call 911. 1 Each 1     No current [facility]-ordered facility-administered medications on file.       Health Maintenance:     ROS:  Gen: no fevers/chills, no changes in weight  Eyes: no changes in vision  ENT: no sore throat, no hearing loss, no bloody nose  Pulm: no sob, no cough  CV: no chest pain, no palpitations  GI: no nausea/vomiting, no diarrhea  : no dysuria  MSk: no myalgias  Skin: no rash  Neuro: no headaches, no numbness/tingling  Heme/Lymph: no easy bruising      Objective:     Exam:  /68 (BP Location: Right arm, Patient Position: Sitting, BP Cuff Size: Adult)   Pulse 94   Temp 36.4 °C (97.6 °F)   Ht 1.575 m (5' 2\")   Wt 48.1 kg (106 lb)   SpO2 95%   BMI 19.39 kg/m²  Body mass index is 19.39 kg/m².    Gen: Alert and oriented, No apparent distress.  Neck: Neck is supple without lymphadenopathy.  Lungs: Normal effort, CTA bilaterally, no wheezes, rhonchi, or rales  CV: Regular rate and rhythm. No murmurs, rubs, or gallops.  Ext: No clubbing, cyanosis, edema.      Labs: No recent labs    Assessment & Plan:     69 y.o. female with the following -     Problem List Items Addressed This Visit          Family Medicine " Problems    Chronic pain syndrome    Relevant Medications    HYDROcodone-acetaminophen (NORCO) 5-325 MG Tab per tablet    HYDROcodone-acetaminophen (NORCO) 5-325 MG Tab per tablet (Start on 3/7/2025)    HYDROcodone-acetaminophen (NORCO) 5-325 MG Tab per tablet (Start on 4/8/2025)    traMADol (ULTRAM) 50 MG Tab    traMADol (ULTRAM) 50 MG Tab (Start on 3/7/2025)    traMADol (ULTRAM) 50 MG Tab (Start on 4/6/2025)       Other    Plantar fasciitis    Relevant Medications    HYDROcodone-acetaminophen (NORCO) 5-325 MG Tab per tablet    HYDROcodone-acetaminophen (NORCO) 5-325 MG Tab per tablet (Start on 3/7/2025)    HYDROcodone-acetaminophen (NORCO) 5-325 MG Tab per tablet (Start on 4/8/2025)    Low back pain    Relevant Medications    HYDROcodone-acetaminophen (NORCO) 5-325 MG Tab per tablet    HYDROcodone-acetaminophen (NORCO) 5-325 MG Tab per tablet (Start on 3/7/2025)    HYDROcodone-acetaminophen (NORCO) 5-325 MG Tab per tablet (Start on 4/8/2025)    traMADol (ULTRAM) 50 MG Tab    traMADol (ULTRAM) 50 MG Tab (Start on 3/7/2025)    traMADol (ULTRAM) 50 MG Tab (Start on 4/6/2025)    Sciatic nerve lesion, left     Still getting back injections q 3months.  Will refill pain meds.          Other Visit Diagnoses       Flu vaccine need        Relevant Orders    INFLUENZA VACCINE TRI INJ (PF)                Return in about 3 months (around 5/5/2025) for med refill.    Please note that this dictation was created using voice recognition software. I have made every reasonable attempt to correct obvious errors, but I expect that there are errors of grammar and possibly content that I did not discover before finalizing the note.

## 2025-05-05 ENCOUNTER — APPOINTMENT (OUTPATIENT)
Dept: MEDICAL GROUP | Facility: CLINIC | Age: 70
End: 2025-05-05
Payer: MEDICARE

## 2025-05-05 VITALS
SYSTOLIC BLOOD PRESSURE: 117 MMHG | HEIGHT: 62 IN | DIASTOLIC BLOOD PRESSURE: 69 MMHG | OXYGEN SATURATION: 95 % | RESPIRATION RATE: 16 BRPM | WEIGHT: 106 LBS | BODY MASS INDEX: 19.51 KG/M2 | TEMPERATURE: 98.2 F | HEART RATE: 109 BPM

## 2025-05-05 DIAGNOSIS — G89.4 CHRONIC PAIN SYNDROME: ICD-10-CM

## 2025-05-05 DIAGNOSIS — G89.29 CHRONIC BILATERAL LOW BACK PAIN WITHOUT SCIATICA: ICD-10-CM

## 2025-05-05 DIAGNOSIS — M54.50 CHRONIC BILATERAL LOW BACK PAIN WITHOUT SCIATICA: ICD-10-CM

## 2025-05-05 DIAGNOSIS — M72.2 PLANTAR FASCIITIS: ICD-10-CM

## 2025-05-05 PROCEDURE — 3078F DIAST BP <80 MM HG: CPT | Performed by: FAMILY MEDICINE

## 2025-05-05 PROCEDURE — 99214 OFFICE O/P EST MOD 30 MIN: CPT | Performed by: FAMILY MEDICINE

## 2025-05-05 PROCEDURE — 3074F SYST BP LT 130 MM HG: CPT | Performed by: FAMILY MEDICINE

## 2025-05-05 RX ORDER — TRAMADOL HYDROCHLORIDE 50 MG/1
50 TABLET ORAL EVERY 4 HOURS PRN
Qty: 60 TABLET | Refills: 0 | Status: SHIPPED | OUTPATIENT
Start: 2025-05-08 | End: 2025-06-07

## 2025-05-05 RX ORDER — TRAMADOL HYDROCHLORIDE 50 MG/1
50 TABLET ORAL EVERY 4 HOURS PRN
Qty: 60 TABLET | Refills: 0 | Status: SHIPPED | OUTPATIENT
Start: 2025-06-07 | End: 2025-07-07

## 2025-05-05 RX ORDER — HYDROCODONE BITARTRATE AND ACETAMINOPHEN 5; 325 MG/1; MG/1
1 TABLET ORAL EVERY 4 HOURS PRN
Qty: 120 TABLET | Refills: 0 | Status: SHIPPED | OUTPATIENT
Start: 2025-07-07 | End: 2025-08-06

## 2025-05-05 RX ORDER — HYDROCODONE BITARTRATE AND ACETAMINOPHEN 5; 325 MG/1; MG/1
1 TABLET ORAL EVERY 4 HOURS PRN
Qty: 120 TABLET | Refills: 0 | Status: SHIPPED | OUTPATIENT
Start: 2025-05-08 | End: 2025-06-07

## 2025-05-05 RX ORDER — HYDROCODONE BITARTRATE AND ACETAMINOPHEN 5; 325 MG/1; MG/1
1 TABLET ORAL EVERY 4 HOURS PRN
Qty: 120 TABLET | Refills: 0 | Status: SHIPPED | OUTPATIENT
Start: 2025-06-07 | End: 2025-07-07

## 2025-05-05 RX ORDER — TRAMADOL HYDROCHLORIDE 50 MG/1
50 TABLET ORAL EVERY 4 HOURS PRN
Qty: 60 TABLET | Refills: 0 | Status: SHIPPED | OUTPATIENT
Start: 2025-07-07 | End: 2025-08-06

## 2025-05-05 NOTE — PROGRESS NOTES
Subjective:     CC: Shingles, controlled substances refill.    HPI:   Jemima presents today with a need for medication refills primarily.  She also states that she is had a bout of shingles that started approximately 4 days ago.  She has a lesion that is near her gluteal cleft.  She had has had that recurrently.  She had some antivirals in the past.  And is wondering if she should take them again.  She is not received her Shingrix vaccination.    She is also needing refill on her pain management.  She has pain in a variety of areas in her feet and her low back in her hands.  She takes combination of tramadol and hydrocodone.  And she has been pretty consistent with that use.  No red flags noted.  Next    Her blood pressure has actually been in good control.  No chest pain or palpitations.    Problem   Low Back Pain    Patient with longstanding history of low back pain.  Is normally seen by Dr. Foreman for this, and has been taking Norco 7.5-325 mg tablets for multiple years.  She states she has never had any adverse reaction secondary to this medication, including confusion, falls, respiratory depression.    Back pain somewhat worse over the holidays    Needs norco refill  Tried to change to tramadol but got no pain relief.  Worried that medication may be interfering with memory.    In pain management.  Will get nerve ablation in a couple of weeks.    Here for medication refills.  He she is finding that combination of the 5 mg Norco with occasional tramadol seems to be effective for pain control.  She is getting back injection next week.     Plantar Fasciitis    Just got PRP treatment of both feet.  Helped a lot    New foot doctor.  PRP not helpful.  Gets mild relief with cortisone foot injections         Current Outpatient Medications Ordered in Epic   Medication Sig Dispense Refill    [START ON 5/8/2025] HYDROcodone-acetaminophen (NORCO) 5-325 MG Tab per tablet Take 1 Tablet by mouth every four hours as needed  (severe pain) for up to 30 days. 120 Tablet 0    [START ON 6/7/2025] HYDROcodone-acetaminophen (NORCO) 5-325 MG Tab per tablet Take 1 Tablet by mouth every four hours as needed (severe pain) for up to 30 days. 120 Tablet 0    [START ON 7/7/2025] HYDROcodone-acetaminophen (NORCO) 5-325 MG Tab per tablet Take 1 Tablet by mouth every four hours as needed (severe pain) for up to 30 days. 120 Tablet 0    [START ON 5/8/2025] traMADol (ULTRAM) 50 MG Tab Take 1 Tablet by mouth every four hours as needed for Moderate Pain for up to 30 days. 60 Tablet 0    [START ON 6/7/2025] traMADol (ULTRAM) 50 MG Tab Take 1 Tablet by mouth every four hours as needed for Moderate Pain for up to 30 days. 60 Tablet 0    [START ON 7/7/2025] traMADol (ULTRAM) 50 MG Tab Take 1 Tablet by mouth every four hours as needed for Moderate Pain for up to 30 days. 60 Tablet 0    cyclobenzaprine (FLEXERIL) 10 mg Tab Take 1 Tablet by mouth 3 times a day as needed for Muscle Spasms. 270 Tablet 3    gabapentin (NEURONTIN) 300 MG Cap Take 1 Capsule by mouth 2 times a day. Indications: foot pain 180 Capsule 3    lisinopril-hydrochlorothiazide (PRINZIDE) 10-12.5 MG per tablet Take 1 Tablet by mouth every day. 90 Tablet 3    meloxicam (MOBIC) 15 MG tablet Take 1 Tablet by mouth every day. 90 Tablet 3    ketorolac (ACULAR) 0.5 % Solution Administer 1 Drop into both eyes 4 times a day. 5 mL 3    SODIUM FLUORIDE 5000 PLUS 1.1 % Cream USE TO BRUSH TEETH TWICE DAILY      oxybutynin (DITROPAN) 5 MG Tab Take 1 Tablet by mouth every evening. 90 Tablet 3    fluticasone (FLONASE) 50 MCG/ACT nasal spray Administer 2 Sprays into affected nostril(S) every day. 16 g 3    hydrocortisone 2.5 % Cream topical cream Apply 1 Application. topically 2 times a day. 30 g 3    hydrocortisone 1 % Cream Apply 1 Application. topically 2 times a day. 30 g 3    omeprazole (PRILOSEC) 20 MG delayed-release capsule TAKE 1 CAPSULE BY MOUTH EVERY DAY 90 Capsule 3    Naloxone (NARCAN) 4 MG/0.1ML  "Liquid One spray in one nostril for overdose and call 911. 1 Each 1     No current [facility]-ordered facility-administered medications on file.       Health Maintenance:     ROS:  Gen: no fevers/chills, no changes in weight  Eyes: no changes in vision  ENT: no sore throat, no hearing loss, no bloody nose  Pulm: no sob, no cough  CV: no chest pain, no palpitations  GI: no nausea/vomiting, no diarrhea  : no dysuria  MSk: no myalgias  Skin: no rash  Neuro: no headaches, no numbness/tingling  Heme/Lymph: no easy bruising      Objective:     Exam:  /69 (BP Location: Right arm, Patient Position: Sitting, BP Cuff Size: Adult)   Pulse (!) 109   Temp 36.8 °C (98.2 °F) (Temporal)   Resp 16   Ht 1.575 m (5' 2\")   Wt 48.1 kg (106 lb)   SpO2 95%   BMI 19.39 kg/m²  Body mass index is 19.39 kg/m².    Gen: Alert and oriented, No apparent distress.  Neck: Neck is supple without lymphadenopathy.  Lungs: Normal effort, CTA bilaterally, no wheezes, rhonchi, or rales  CV: Regular rate and rhythm. No murmurs, rubs, or gallops.  Ext: No clubbing, cyanosis, edema.  Skin: She has a palpable lesion not far from her anus.  That she calls shingles.  However it does not appear to be shingles by my examination.      Labs: Labs okay    Assessment & Plan:     70 y.o. female with the following -     Problem List Items Addressed This Visit          Family Medicine Problems    Chronic pain syndrome    Relevant Medications    HYDROcodone-acetaminophen (NORCO) 5-325 MG Tab per tablet (Start on 5/8/2025)    HYDROcodone-acetaminophen (NORCO) 5-325 MG Tab per tablet (Start on 6/7/2025)    HYDROcodone-acetaminophen (NORCO) 5-325 MG Tab per tablet (Start on 7/7/2025)    traMADol (ULTRAM) 50 MG Tab (Start on 5/8/2025)    traMADol (ULTRAM) 50 MG Tab (Start on 6/7/2025)    traMADol (ULTRAM) 50 MG Tab (Start on 7/7/2025)       Other    Plantar fasciitis    To get injections of feet this week at podiatrist.          Relevant Medications    " HYDROcodone-acetaminophen (NORCO) 5-325 MG Tab per tablet (Start on 5/8/2025)    HYDROcodone-acetaminophen (NORCO) 5-325 MG Tab per tablet (Start on 6/7/2025)    HYDROcodone-acetaminophen (NORCO) 5-325 MG Tab per tablet (Start on 7/7/2025)    Low back pain    Pain about the same  Refill tramadol and hydrocodone         Relevant Medications    HYDROcodone-acetaminophen (NORCO) 5-325 MG Tab per tablet (Start on 5/8/2025)    HYDROcodone-acetaminophen (NORCO) 5-325 MG Tab per tablet (Start on 6/7/2025)    HYDROcodone-acetaminophen (NORCO) 5-325 MG Tab per tablet (Start on 7/7/2025)    traMADol (ULTRAM) 50 MG Tab (Start on 5/8/2025)    traMADol (ULTRAM) 50 MG Tab (Start on 6/7/2025)    traMADol (ULTRAM) 50 MG Tab (Start on 7/7/2025)           No follow-ups on file.    Please note that this dictation was created using voice recognition software. I have made every reasonable attempt to correct obvious errors, but I expect that there are errors of grammar and possibly content that I did not discover before finalizing the note.

## 2025-05-08 NOTE — TELEPHONE ENCOUNTER
Received request via: Pharmacy    Was the patient seen in the last year in this department? Yes    Does the patient have an active prescription (recently filled or refills available) for medication(s) requested? No    Pharmacy Name:   [id number] - [place], [place] - [address] AT [facility] [address] [address]  Phone: [phone] Fax: [phone]      Does the patient have [facility] Plus and need 100-day supply? (This applies to ALL medications) Patient does not have SCP

## 2025-05-12 RX ORDER — OXYBUTYNIN CHLORIDE 5 MG/1
5 TABLET ORAL NIGHTLY
Qty: 90 TABLET | Refills: 3 | Status: SHIPPED | OUTPATIENT
Start: 2025-05-12

## 2025-07-23 ENCOUNTER — OFFICE VISIT (OUTPATIENT)
Dept: MEDICAL GROUP | Facility: CLINIC | Age: 70
End: 2025-07-23

## 2025-07-23 VITALS
TEMPERATURE: 98 F | DIASTOLIC BLOOD PRESSURE: 71 MMHG | HEART RATE: 88 BPM | HEIGHT: 62 IN | WEIGHT: 106 LBS | SYSTOLIC BLOOD PRESSURE: 121 MMHG | BODY MASS INDEX: 19.51 KG/M2 | OXYGEN SATURATION: 94 %

## 2025-07-23 DIAGNOSIS — M54.50 CHRONIC BILATERAL LOW BACK PAIN WITHOUT SCIATICA: ICD-10-CM

## 2025-07-23 DIAGNOSIS — G89.29 CHRONIC BILATERAL LOW BACK PAIN WITHOUT SCIATICA: ICD-10-CM

## 2025-07-23 DIAGNOSIS — M72.2 PLANTAR FASCIITIS: ICD-10-CM

## 2025-07-23 DIAGNOSIS — R41.3 MEMORY LOSS: ICD-10-CM

## 2025-07-23 DIAGNOSIS — G89.4 CHRONIC PAIN SYNDROME: ICD-10-CM

## 2025-07-23 PROCEDURE — 3078F DIAST BP <80 MM HG: CPT | Performed by: FAMILY MEDICINE

## 2025-07-23 PROCEDURE — 99214 OFFICE O/P EST MOD 30 MIN: CPT | Performed by: FAMILY MEDICINE

## 2025-07-23 PROCEDURE — 3074F SYST BP LT 130 MM HG: CPT | Performed by: FAMILY MEDICINE

## 2025-07-23 RX ORDER — HYDROCODONE BITARTRATE AND ACETAMINOPHEN 5; 325 MG/1; MG/1
1 TABLET ORAL EVERY 4 HOURS PRN
Qty: 120 TABLET | Refills: 0 | Status: SHIPPED | OUTPATIENT
Start: 2025-08-06 | End: 2025-09-05

## 2025-07-23 RX ORDER — HYDROCODONE BITARTRATE AND ACETAMINOPHEN 5; 325 MG/1; MG/1
1 TABLET ORAL EVERY 4 HOURS PRN
Qty: 120 TABLET | Refills: 0 | Status: SHIPPED | OUTPATIENT
Start: 2025-09-05 | End: 2025-10-05

## 2025-07-23 RX ORDER — TRAMADOL HYDROCHLORIDE 50 MG/1
50 TABLET ORAL EVERY 4 HOURS PRN
Qty: 60 TABLET | Refills: 0 | Status: SHIPPED | OUTPATIENT
Start: 2025-08-06 | End: 2025-09-05

## 2025-07-23 RX ORDER — HYDROCODONE BITARTRATE AND ACETAMINOPHEN 5; 325 MG/1; MG/1
1 TABLET ORAL EVERY 4 HOURS PRN
Qty: 120 TABLET | Refills: 0 | Status: SHIPPED | OUTPATIENT
Start: 2025-10-05 | End: 2025-11-04

## 2025-07-23 RX ORDER — TRAMADOL HYDROCHLORIDE 50 MG/1
50 TABLET ORAL EVERY 4 HOURS PRN
Qty: 60 TABLET | Refills: 0 | Status: SHIPPED | OUTPATIENT
Start: 2025-10-05 | End: 2025-11-04

## 2025-07-23 RX ORDER — KETOROLAC TROMETHAMINE 5 MG/ML
1 SOLUTION OPHTHALMIC 4 TIMES DAILY
Qty: 5 ML | Refills: 3 | Status: SHIPPED | OUTPATIENT
Start: 2025-07-23

## 2025-07-23 RX ORDER — TRAMADOL HYDROCHLORIDE 50 MG/1
50 TABLET ORAL EVERY 4 HOURS PRN
Qty: 60 TABLET | Refills: 0 | Status: SHIPPED | OUTPATIENT
Start: 2025-09-05 | End: 2025-10-05

## 2025-07-23 NOTE — PROGRESS NOTES
Subjective:     CC: Med refills, allergy meds, memory loss    HPI:   Jemima presents today with the above problems.  It is time for refill of her narcotic medications.  She is getting very concerned about not getting refills on time.  She is exhibiting some red flags.  We had a long discussion regarding tapering her medications.  's concerned about ongoing dementia.  [place] that neurology consultation was not helpful.  We discussed the fact that they were probably not going to consider too much about memory loss when she is taking the medicines that she is taking.    She did not needs refills on her ketorolac medicine.    Problem   Memory Loss    Noticed memory issues over last year.  Has had periods of confusion and unable to find way home.  Cannot remember names     Low Back Pain    Patient with longstanding history of low back pain.  Is normally seen by Dr. Foreman for this, and has been taking Norco 7.5-325 mg tablets for multiple years.  She states she has never had any adverse reaction secondary to this medication, including confusion, falls, respiratory depression.    Back pain somewhat worse over the holidays    Needs norco refill  Tried to change to tramadol but got no pain relief.  Worried that medication may be interfering with memory.    In pain management.  Will get nerve ablation in a couple of weeks.    Here for medication refills.  He she is finding that combination of the 5 mg Norco with occasional tramadol seems to be effective for pain control.  She is getting back injection next week.     Plantar Fasciitis    Just got PRP treatment of both feet.  Helped a lot    New foot doctor.  PRP not helpful.  Gets mild relief with cortisone foot injections         Current Medications and Prescriptions Ordered in [facility][1]    Health Maintenance:     ROS:  Gen: no fevers/chills, no changes in weight  Eyes: no changes in vision  ENT: no sore throat, no hearing loss, no bloody nose  Pulm: no sob, no cough  CV:  "no chest pain, no palpitations  GI: no nausea/vomiting, no diarrhea  : no dysuria  MSk: no myalgias  Skin: no rash  Neuro: no headaches, no numbness/tingling  Heme/Lymph: no easy bruising      Objective:     Exam:  /71   Pulse 88   Temp 36.7 °C (98 °F)   Ht 1.575 m (5' 2\")   Wt 48.1 kg (106 lb)   SpO2 94%   BMI 19.39 kg/m²  Body mass index is 19.39 kg/m².    Gen: Alert and oriented, No apparent distress.  Neck: Neck is supple without lymphadenopathy.  Lungs: Normal effort, CTA bilaterally, no wheezes, rhonchi, or rales  CV: Regular rate and rhythm. No murmurs, rubs, or gallops.  Ext: No clubbing, cyanosis, edema.      Labs: Up-to-date labs look normal.    Assessment & Plan:     70 y.o. female with the following -     Problem List Items Addressed This Visit          Family Medicine Problems    Chronic pain syndrome    Relevant Medications    traMADol (ULTRAM) 50 MG Tab (Start on 8/6/2025)    traMADol (ULTRAM) 50 MG Tab (Start on 9/5/2025)    traMADol (ULTRAM) 50 MG Tab (Start on 10/5/2025)    HYDROcodone-acetaminophen (NORCO) 5-325 MG Tab per tablet (Start on 8/6/2025)    HYDROcodone-acetaminophen (NORCO) 5-325 MG Tab per tablet (Start on 9/5/2025)    HYDROcodone-acetaminophen (NORCO) 5-325 MG Tab per tablet (Start on 10/5/2025)       Other    Plantar fasciitis    Continues with a lot of foot pain but gets frequent foot injections by podiatry         Relevant Medications    HYDROcodone-acetaminophen (NORCO) 5-325 MG Tab per tablet (Start on 8/6/2025)    HYDROcodone-acetaminophen (NORCO) 5-325 MG Tab per tablet (Start on 9/5/2025)    HYDROcodone-acetaminophen (NORCO) 5-325 MG Tab per tablet (Start on 10/5/2025)    Low back pain    Getting back injections by [facility].           Relevant Medications    traMADol (ULTRAM) 50 MG Tab (Start on 8/6/2025)    traMADol (ULTRAM) 50 MG Tab (Start on 9/5/2025)    traMADol (ULTRAM) 50 MG Tab (Start on 10/5/2025)    HYDROcodone-acetaminophen (NORCO) 5-325 MG " Tab per tablet (Start on 8/6/2025)    HYDROcodone-acetaminophen (NORCO) 5-325 MG Tab per tablet (Start on 9/5/2025)    HYDROcodone-acetaminophen (NORCO) 5-325 MG Tab per tablet (Start on 10/5/2025)    Memory loss    Increased memory issues.    Discussed taping off meds that could be exacerbating memory issues.  Patient reluctant.                 Return in about 3 months (around 10/23/2025) for med refill.    Please note that this dictation was created using voice recognition software. I have made every reasonable attempt to correct obvious errors, but I expect that there are errors of grammar and possibly content that I did not discover before finalizing the note.             [1]   Current Outpatient Medications Ordered in Epic   Medication Sig Dispense Refill    [START ON 8/6/2025] traMADol (ULTRAM) 50 MG Tab Take 1 Tablet by mouth every four hours as needed for Moderate Pain for up to 30 days. 60 Tablet 0    [START ON 9/5/2025] traMADol (ULTRAM) 50 MG Tab Take 1 Tablet by mouth every four hours as needed for Moderate Pain for up to 30 days. 60 Tablet 0    [START ON 10/5/2025] traMADol (ULTRAM) 50 MG Tab Take 1 Tablet by mouth every four hours as needed for Moderate Pain for up to 30 days. 60 Tablet 0    [START ON 8/6/2025] HYDROcodone-acetaminophen (NORCO) 5-325 MG Tab per tablet Take 1 Tablet by mouth every four hours as needed (severe pain) for up to 30 days. 120 Tablet 0    [START ON 9/5/2025] HYDROcodone-acetaminophen (NORCO) 5-325 MG Tab per tablet Take 1 Tablet by mouth every four hours as needed (severe pain) for up to 30 days. 120 Tablet 0    [START ON 10/5/2025] HYDROcodone-acetaminophen (NORCO) 5-325 MG Tab per tablet Take 1 Tablet by mouth every four hours as needed (severe pain) for up to 30 days. 120 Tablet 0    ketorolac (ACULAR) 0.5 % Solution Administer 1 Drop into both eyes 4 times a day. 5 mL 3    oxybutynin (DITROPAN) 5 MG Tab TAKE 1 TABLET BY MOUTH EVERY EVENING 90 Tablet 3    cyclobenzaprine  (FLEXERIL) 10 mg Tab Take 1 Tablet by mouth 3 times a day as needed for Muscle Spasms. 270 Tablet 3    gabapentin (NEURONTIN) 300 MG Cap Take 1 Capsule by mouth 2 times a day. Indications: foot pain 180 Capsule 3    lisinopril-hydrochlorothiazide (PRINZIDE) 10-12.5 MG per tablet Take 1 Tablet by mouth every day. 90 Tablet 3    meloxicam (MOBIC) 15 MG tablet Take 1 Tablet by mouth every day. 90 Tablet 3    SODIUM FLUORIDE 5000 PLUS 1.1 % Cream USE TO BRUSH TEETH TWICE DAILY      fluticasone (FLONASE) 50 MCG/ACT nasal spray Administer 2 Sprays into affected nostril(S) every day. 16 g 3    hydrocortisone 2.5 % Cream topical cream Apply 1 Application. topically 2 times a day. 30 g 3    hydrocortisone 1 % Cream Apply 1 Application. topically 2 times a day. 30 g 3    omeprazole (PRILOSEC) 20 MG delayed-release capsule TAKE 1 CAPSULE BY MOUTH EVERY DAY 90 Capsule 3    Naloxone (NARCAN) 4 MG/0.1ML Liquid One spray in one nostril for overdose and call 911. 1 Each 1     No current [facility]-ordered facility-administered medications on file.

## 2025-07-23 NOTE — ASSESSMENT & PLAN NOTE
Increased memory issues.    Discussed taping off meds that could be exacerbating memory issues.  Patient reluctant.

## 2025-08-27 RX ORDER — LISINOPRIL AND HYDROCHLOROTHIAZIDE 10; 12.5 MG/1; MG/1
1 TABLET ORAL DAILY
Qty: 90 TABLET | Refills: 3 | Status: SHIPPED | OUTPATIENT
Start: 2025-08-27

## (undated) DEVICE — KIT  I.V. START (100EA/CA)

## (undated) DEVICE — PACK BASIC CATARACT - (4/BX)

## (undated) DEVICE — ELECTRODE 850 FOAM ADHESIVE - HYDROGEL RADIOTRNSPRNT (50/PK)

## (undated) DEVICE — TIP POLYMER I&A

## (undated) DEVICE — NEEDLE FILTER ASPIRATION 18 GA X 1 1/2 IN (100EA/BX)

## (undated) DEVICE — KIT ANESTHESIA W/CIRCUIT & 3/LT BAG W/FILTER (20EA/CA)

## (undated) DEVICE — SODIUM CHL IRRIGATION 0.9% 1000ML (12EA/CA)

## (undated) DEVICE — GLOVE BIOGEL M SZ 8 SURGICAL PF LTX - (50/BX 4BX/CA)

## (undated) DEVICE — NEEDLE CYSTOTOME OPTH VSTC  0.4MM X 16MM - (10/CA)

## (undated) DEVICE — SENSOR SPO2 NEO LNCS ADHESIVE (20/BX) SEE USER NOTES

## (undated) DEVICE — KNIFE MICROSURGICAL 15 DEGREE GREEN

## (undated) DEVICE — LACTATED RINGERS INJ 1000 ML - (14EA/CA 60CA/PF)

## (undated) DEVICE — SET LEADWIRE 5 LEAD BEDSIDE DISPOSABLE ECG (1SET OF 5/EA)

## (undated) DEVICE — CANISTER SUCTION RIGID RED 1500CC (40EA/CA)

## (undated) DEVICE — SUCTION INSTRUMENT YANKAUER BULBOUS TIP W/O VENT (50EA/CA)

## (undated) DEVICE — CATHETER IV 20 GA X 1-1/4 ---SURG.& SDS ONLY--- (50EA/BX)

## (undated) DEVICE — TUBING CLEARLINK DUO-VENT - C-FLO (48EA/CA)

## (undated) DEVICE — Device

## (undated) DEVICE — CANNULA INJECTION 27G (EYE) - 10/BX ALCON

## (undated) DEVICE — KIT, EYE POST-OP FOR PHACOS

## (undated) DEVICE — TUBE CONNECTING SUCTION - CLEAR PLASTIC STERILE 72 IN (50EA/CA)

## (undated) DEVICE — CANISTER SUCTION 3000ML MECHANICAL FILTER AUTO SHUTOFF MEDI-VAC NONSTERILE LF DISP  (40EA/CA)

## (undated) DEVICE — BLADE 3.0MM ANGLED DBL BEVEL WITH SAFETY (10/CA)

## (undated) DEVICE — TIP MINI FLARE 30 DEGREE OZIL - (6/BX)

## (undated) DEVICE — WATER IRRIGATION STERILE 1000ML (12EA/CA)